# Patient Record
Sex: FEMALE | Race: ASIAN | Employment: FULL TIME | URBAN - METROPOLITAN AREA
[De-identification: names, ages, dates, MRNs, and addresses within clinical notes are randomized per-mention and may not be internally consistent; named-entity substitution may affect disease eponyms.]

---

## 2019-12-25 ENCOUNTER — APPOINTMENT (OUTPATIENT)
Dept: GENERAL RADIOLOGY | Age: 64
End: 2019-12-25
Attending: EMERGENCY MEDICINE
Payer: COMMERCIAL

## 2019-12-25 ENCOUNTER — APPOINTMENT (OUTPATIENT)
Dept: CT IMAGING | Age: 64
End: 2019-12-25
Attending: EMERGENCY MEDICINE
Payer: COMMERCIAL

## 2019-12-25 ENCOUNTER — HOSPITAL ENCOUNTER (EMERGENCY)
Age: 64
Discharge: HOME | End: 2019-12-25
Attending: EMERGENCY MEDICINE
Payer: COMMERCIAL

## 2019-12-25 VITALS
DIASTOLIC BLOOD PRESSURE: 77 MMHG | OXYGEN SATURATION: 98 % | BODY MASS INDEX: 25.91 KG/M2 | WEIGHT: 132 LBS | HEIGHT: 60 IN | HEART RATE: 68 BPM | TEMPERATURE: 97.8 F | SYSTOLIC BLOOD PRESSURE: 174 MMHG | RESPIRATION RATE: 16 BRPM

## 2019-12-25 DIAGNOSIS — S09.90XA CLOSED HEAD INJURY, INITIAL ENCOUNTER: ICD-10-CM

## 2019-12-25 DIAGNOSIS — S62.101A CLOSED FRACTURE OF RIGHT WRIST, INITIAL ENCOUNTER: Primary | ICD-10-CM

## 2019-12-25 DIAGNOSIS — S01.01XA LACERATION OF SCALP, INITIAL ENCOUNTER: ICD-10-CM

## 2019-12-25 PROCEDURE — A4565 SLINGS: HCPCS

## 2019-12-25 PROCEDURE — 75810000293 HC SIMP/SUPERF WND  RPR: Performed by: EMERGENCY MEDICINE

## 2019-12-25 PROCEDURE — 74011250636 HC RX REV CODE- 250/636: Performed by: EMERGENCY MEDICINE

## 2019-12-25 PROCEDURE — 74011250637 HC RX REV CODE- 250/637: Performed by: EMERGENCY MEDICINE

## 2019-12-25 PROCEDURE — 90471 IMMUNIZATION ADMIN: CPT | Performed by: EMERGENCY MEDICINE

## 2019-12-25 PROCEDURE — 90715 TDAP VACCINE 7 YRS/> IM: CPT | Performed by: EMERGENCY MEDICINE

## 2019-12-25 PROCEDURE — 75810000053 HC SPLINT APPLICATION: Performed by: EMERGENCY MEDICINE

## 2019-12-25 PROCEDURE — 77030039266 HC ADH SKN EXOFIN S2SG -A

## 2019-12-25 PROCEDURE — 70450 CT HEAD/BRAIN W/O DYE: CPT

## 2019-12-25 PROCEDURE — 73110 X-RAY EXAM OF WRIST: CPT

## 2019-12-25 PROCEDURE — 99283 EMERGENCY DEPT VISIT LOW MDM: CPT | Performed by: EMERGENCY MEDICINE

## 2019-12-25 RX ORDER — MORPHINE SULFATE 15 MG/1
15 TABLET ORAL ONCE
Status: COMPLETED | OUTPATIENT
Start: 2019-12-25 | End: 2019-12-25

## 2019-12-25 RX ORDER — ONDANSETRON 4 MG/1
4 TABLET, ORALLY DISINTEGRATING ORAL
Qty: 10 TAB | Refills: 0 | Status: SHIPPED | OUTPATIENT
Start: 2019-12-25 | End: 2020-02-10 | Stop reason: ALTCHOICE

## 2019-12-25 RX ORDER — MORPHINE SULFATE 15 MG/1
15 TABLET ORAL
Qty: 8 TAB | Refills: 0 | Status: SHIPPED | OUTPATIENT
Start: 2019-12-25 | End: 2019-12-28

## 2019-12-25 RX ORDER — ONDANSETRON 4 MG/1
4 TABLET, ORALLY DISINTEGRATING ORAL
Status: COMPLETED | OUTPATIENT
Start: 2019-12-25 | End: 2019-12-25

## 2019-12-25 RX ADMIN — MORPHINE SULFATE 15 MG: 15 TABLET ORAL at 18:30

## 2019-12-25 RX ADMIN — ONDANSETRON 4 MG: 4 TABLET, ORALLY DISINTEGRATING ORAL at 18:30

## 2019-12-25 RX ADMIN — TETANUS TOXOID, REDUCED DIPHTHERIA TOXOID AND ACELLULAR PERTUSSIS VACCINE, ADSORBED 0.5 ML: 5; 2.5; 8; 8; 2.5 SUSPENSION INTRAMUSCULAR at 18:31

## 2019-12-25 NOTE — ED TRIAGE NOTES
Pt states that she slipped and fell.   C/o right wrist pain and a small laceration to the right side of the scalp

## 2019-12-25 NOTE — ED PROVIDER NOTES
61-year-old female tripped [place] [place] and fell with her right arm outstretched, injuring her right wrist.  She struck the right side of her head and suffered a laceration. No loss of consciousness or headache. No nausea, vomiting, confusion. She has severe pain in her right wrist.  No numbness or tingling. Unknown last tetanus. Past Medical History:   Diagnosis Date    Esophageal reflux 3/19/2014    Hypercholesterolemia     Hypothyroidism 3/19/2014       History reviewed. No pertinent surgical history. History reviewed. No pertinent family history.     Social History     Socioeconomic History    Marital status:      Spouse name: Not on file    Number of children: Not on file    Years of education: Not on file    Highest education level: Not on file   Occupational History    Not on file   Social Needs    Financial resource strain: Not on file    Food insecurity:     Worry: Not on file     Inability: Not on file    Transportation needs:     Medical: Not on file     Non-medical: Not on file   Tobacco Use    Smoking status: Never Smoker    Smokeless tobacco: Never Used   Substance and Sexual Activity    Alcohol use: No    Drug use: No    Sexual activity: Yes     Partners: Male   Lifestyle    Physical activity:     Days per week: Not on file     Minutes per session: Not on file    Stress: Not on file   Relationships    Social connections:     Talks on phone: Not on file     Gets together: Not on file     Attends [employer] service: Not on file     Active member of club or organization: Not on file     Attends meetings of clubs or organizations: Not on file     Relationship status: Not on file    Intimate partner violence:     Fear of current or ex partner: Not on file     Emotionally abused: Not on file     Physically abused: Not on file     Forced sexual activity: Not on file   Other Topics Concern    Not on file   Social History Narrative    Not on file ALLERGIES: Depo-medrol [methylprednisolone acetate]    Review of Systems   Constitutional: Negative for chills and fever. HENT: Negative for hearing loss. Eyes: Negative for visual disturbance. Respiratory: Negative for cough and shortness of breath. Cardiovascular: Negative for chest pain and palpitations. Gastrointestinal: Negative for abdominal pain, diarrhea, nausea and vomiting. Musculoskeletal: Positive for arthralgias and joint swelling. Negative for back pain. Skin: Positive for wound. Negative for rash. Neurological: Negative for weakness and headaches. Psychiatric/Behavioral: Negative for confusion. Vitals:    12/25/19 1747   BP: 149/67   Pulse: 80   Resp: 16   Temp: 97.1 °F (36.2 °C)   SpO2: 98%   Weight: 59.9 kg (132 lb)   Height: 5' (1.524 m)            Physical Exam  Vitals signs and nursing note reviewed. Constitutional:       Appearance: She is well-developed. HENT:      Head: Normocephalic. Eyes:      Pupils: Pupils are equal, round, and reactive to light. Neck:      Musculoskeletal: Normal range of motion and neck supple. Cardiovascular:      Rate and Rhythm: Regular rhythm. Heart sounds: Normal heart sounds. Pulmonary:      Effort: Pulmonary effort is normal.      Breath sounds: Normal breath sounds. Abdominal:      Palpations: Abdomen is soft. Tenderness: There is no tenderness. Musculoskeletal:      Right wrist: She exhibits decreased range of motion, tenderness, bony tenderness, swelling and deformity. Arms:    Skin:     General: Skin is warm and dry. Neurological:      Mental Status: She is alert. Psychiatric:         Behavior: Behavior normal.          MDM  Number of Diagnoses or Management Options  Diagnosis management comments: Parts of this document were created using dragon voice recognition software. The chart has been reviewed but errors may still be present.     7:47 PM  Sugar tong splint placed by medic. rosa Hicks with ortho for follow up. Pain treated     I discussed the results of all labs, procedures, radiographs, and treatments with the patient and available family. Treatment plan is agreed upon and the patient is ready for discharge. Questions about treatment in the ED and differential diagnosis of presenting condition were answered. Patient was given verbal discharge instructions including, but not limited to, importance of returning to the emergency department for any concern of worsening or continued symptoms. Instructions were given to follow up with a primary care provider or specialist within 1-2 days. Adverse effects of medications, if prescribed, were discussed and patient was advised to refrain from significant physical activity until followed up by primary care physician and to not drive or operate heavy machinery after taking any sedating substances. Amount and/or Complexity of Data Reviewed  Tests in the radiology section of CPT®: ordered and reviewed (Xr Wrist Rt Ap/lat/obl Min 3v    Result Date: 12/25/2019  EXAMINATION: WRIST RADIOGRAPHS 12/25/2019 6:02 PM ACCESSION NUMBER: [id number] INDICATION: fall COMPARISON: None available TECHNIQUE: 3 views of the right wrist were obtained. FINDINGS: There is a transverse fracture of the distal radial metaphysis. There is dorsal displacement, foreshortening, and dorsal angulation of the distal fracture fragment. There is a minimally displaced fracture of the ulnar styloid process. The proximal carpal row remains aligned with the distal radial fracture fragment. There is associated soft tissue swelling. No radiopaque foreign body. IMPRESSION: Acute fractures of the distal right radius and ulna, as above.  VOICE DICTATED BY: Dr. Governor Maki    Result Date: 12/25/2019  EXAMINATION: HEAD CT WITHOUT CONTRAST 12/25/2019 6:46 PM ACCESSION NUMBER: [id number] INDICATION: fall, head injury COMPARISON: None available TECHNIQUE: Multiple-row detector helical CT examination of the head without intravenous contrast. Radiation dose reduction techniques were used for this study:  Our CT scanners use one or all of the following: Automated exposure control, adjustment of the mA and/or kVp according to patient's size, iterative reconstruction. FINDINGS: Small right frontal scalp hematoma. The ventricles are within normal limits. There is no midline shift. The basilar cisterns are patent. There is no cerebellar tonsillar ectopia or herniation. There is no evidence of acute intracranial hemorrhage or extra-axial collections. There is no CT evidence of acute infarction. No evidence of skull fracture. IMPRESSION: 1. Small right frontal scalp hematoma. 2. No underlying acute intracranial abnormality. VOICE DICTATED BY: Dr. Villegas Sat     )  Tests in the medicine section of CPT®: reviewed and ordered           Wound Closure by Adhesive  Date/Time: 12/25/2019 7:46 PM  Performed by: Isabel Cordova MD  Authorized by: Isabel Cordova MD     Consent:     Consent obtained:  Verbal    Consent given by:  Patient  Laceration details:     Location:  Scalp    Scalp location:  R temporal    Length (cm):  1  Repair type:     Repair type:  Simple  Exploration:     Contaminated: no    Treatment:     Area cleansed with:  Soap and water    Amount of cleaning:  Standard    Irrigation solution:  Sterile saline    Visualized foreign bodies/material removed: no    Skin repair:     Repair method:  Tissue adhesive  Approximation:     Approximation:  Close  Post-procedure details:     Dressing:  Open (no dressing)    Patient tolerance of procedure:   Tolerated well, no immediate complications  Comments:      HAT used to repair wound with dermabond

## 2019-12-26 NOTE — ED NOTES
I have reviewed discharge instructions with the patient and spouse. The patient and spouse verbalized understanding. Patient left ED via Discharge Method: ambulatory to Home with Enrico Andrade, her . Opportunity for questions and clarification provided. Patient given 2 scripts. To continue your aftercare when you leave the hospital, you may receive an automated call from our care team to check in on how you are doing. This is a free service and part of our promise to provide the best care and service to meet your aftercare needs.  If you have questions, or wish to unsubscribe from this service please call [phone]. Thank you for Choosing our [facility] Emergency Department.

## 2019-12-26 NOTE — PROGRESS NOTES
Patient called requesting a referral be faxed to The [address] [place] [address]. Referral sent per the patient's request, no additional needs.      Stephanie Villegas, [address] [facility]    [address] [facility]  [email or web address]

## 2019-12-26 NOTE — DISCHARGE INSTRUCTIONS
Keep sling and splint in place. Apply ice to wrist and scalp. Do not wash hair for several days. You may apply Vaseline to glue after 2 weeks to dissolve the glue once wound is healed. Return for worsening or concerning symptoms such as severe pain, headache, confusion, repetitive vomiting.

## 2019-12-30 ENCOUNTER — ANESTHESIA EVENT (OUTPATIENT)
Dept: SURGERY | Age: 64
End: 2019-12-30
Payer: COMMERCIAL

## 2019-12-30 RX ORDER — SODIUM CHLORIDE 0.9 % (FLUSH) 0.9 %
5-40 SYRINGE (ML) INJECTION EVERY 8 HOURS
Status: CANCELLED | OUTPATIENT
Start: 2019-12-30

## 2019-12-30 RX ORDER — SODIUM CHLORIDE 0.9 % (FLUSH) 0.9 %
5-40 SYRINGE (ML) INJECTION AS NEEDED
Status: CANCELLED | OUTPATIENT
Start: 2019-12-30

## 2019-12-31 ENCOUNTER — ANESTHESIA (OUTPATIENT)
Dept: SURGERY | Age: 64
End: 2019-12-31
Payer: COMMERCIAL

## 2019-12-31 ENCOUNTER — HOSPITAL ENCOUNTER (OUTPATIENT)
Age: 64
Setting detail: OUTPATIENT SURGERY
Discharge: HOME | End: 2019-12-31
Attending: ORTHOPAEDIC SURGERY | Admitting: ORTHOPAEDIC SURGERY
Payer: COMMERCIAL

## 2019-12-31 ENCOUNTER — APPOINTMENT (OUTPATIENT)
Dept: GENERAL RADIOLOGY | Age: 64
End: 2019-12-31
Attending: ORTHOPAEDIC SURGERY
Payer: COMMERCIAL

## 2019-12-31 VITALS
HEIGHT: 59 IN | RESPIRATION RATE: 16 BRPM | DIASTOLIC BLOOD PRESSURE: 60 MMHG | WEIGHT: 138 LBS | BODY MASS INDEX: 27.82 KG/M2 | HEART RATE: 75 BPM | OXYGEN SATURATION: 91 % | SYSTOLIC BLOOD PRESSURE: 142 MMHG | TEMPERATURE: 98 F

## 2019-12-31 PROCEDURE — 76010010054 HC POST OP PAIN BLOCK: Performed by: ORTHOPAEDIC SURGERY

## 2019-12-31 PROCEDURE — 76210000020 HC REC RM PH II FIRST 0.5 HR: Performed by: ORTHOPAEDIC SURGERY

## 2019-12-31 PROCEDURE — 74011000250 HC RX REV CODE- 250: Performed by: NURSE ANESTHETIST, CERTIFIED REGISTERED

## 2019-12-31 PROCEDURE — 77030000031 HC BIT DRL QC SYNT -C: Performed by: ORTHOPAEDIC SURGERY

## 2019-12-31 PROCEDURE — 74011250636 HC RX REV CODE- 250/636: Performed by: ANESTHESIOLOGY

## 2019-12-31 PROCEDURE — 77030003602 HC NDL NRV BLK BBMI -B: Performed by: ANESTHESIOLOGY

## 2019-12-31 PROCEDURE — 77030018836 HC SOL IRR NACL ICUM -A: Performed by: ORTHOPAEDIC SURGERY

## 2019-12-31 PROCEDURE — 74011250636 HC RX REV CODE- 250/636: Performed by: NURSE ANESTHETIST, CERTIFIED REGISTERED

## 2019-12-31 PROCEDURE — C1713 ANCHOR/SCREW BN/BN,TIS/BN: HCPCS | Performed by: ORTHOPAEDIC SURGERY

## 2019-12-31 PROCEDURE — 74011250636 HC RX REV CODE- 250/636: Performed by: ORTHOPAEDIC SURGERY

## 2019-12-31 PROCEDURE — 76210000063 HC OR PH I REC FIRST 0.5 HR: Performed by: ORTHOPAEDIC SURGERY

## 2019-12-31 PROCEDURE — 77030002916 HC SUT ETHLN J&J -A: Performed by: ORTHOPAEDIC SURGERY

## 2019-12-31 PROCEDURE — 76060000032 HC ANESTHESIA 0.5 TO 1 HR: Performed by: ORTHOPAEDIC SURGERY

## 2019-12-31 PROCEDURE — 76010000161 HC OR TIME 1 TO 1.5 HR INTENSV-TIER 1: Performed by: ORTHOPAEDIC SURGERY

## 2019-12-31 PROCEDURE — 77030008847 HC WRE K SYNT -A: Performed by: ORTHOPAEDIC SURGERY

## 2019-12-31 PROCEDURE — 73100 X-RAY EXAM OF WRIST: CPT

## 2019-12-31 PROCEDURE — A4565 SLINGS: HCPCS | Performed by: ORTHOPAEDIC SURGERY

## 2019-12-31 PROCEDURE — 77030000032 HC CUF TRNQT ZIMM -B: Performed by: ORTHOPAEDIC SURGERY

## 2019-12-31 PROCEDURE — 76942 ECHO GUIDE FOR BIOPSY: CPT | Performed by: ORTHOPAEDIC SURGERY

## 2019-12-31 PROCEDURE — 77030002966 HC SUT PDS J&J -A: Performed by: ORTHOPAEDIC SURGERY

## 2019-12-31 DEVICE — SCREW BNE L10MM DIA2.4MM CORT S STL ST T8 STARDRV RECESS: Type: IMPLANTABLE DEVICE | Site: ARM | Status: FUNCTIONAL

## 2019-12-31 DEVICE — 2.4MM VA LOCKING SCREW STARDRIVE 14MM: Type: IMPLANTABLE DEVICE | Site: ARM | Status: FUNCTIONAL

## 2019-12-31 DEVICE — SCREW BNE L14MM DIA2.4MM S STL ST LOK FULL THRD T8 STARDRV: Type: IMPLANTABLE DEVICE | Site: ARM | Status: FUNCTIONAL

## 2019-12-31 DEVICE — SCREW BNE L13MM DIA24MM UNICORTICAL S STL ST NONCANNULATED: Type: IMPLANTABLE DEVICE | Site: ARM | Status: FUNCTIONAL

## 2019-12-31 DEVICE — SCREW BNE L10MM DIA2.4MM S STL ST VAR ANG LOK FULL THRD T8: Type: IMPLANTABLE DEVICE | Site: ARM | Status: FUNCTIONAL

## 2019-12-31 DEVICE — 2.4MM VA-LCP 2-CLMN VLR DSTL RADIUS PL 6H HD/3H SHAFT/RIGHT
Type: IMPLANTABLE DEVICE | Site: ARM | Status: FUNCTIONAL
Brand: VA-LCP

## 2019-12-31 DEVICE — PIN FIX L16MM DIA1.8MM DST RAD S STL BTTRS VAR ANG LOK T8: Type: IMPLANTABLE DEVICE | Site: ARM | Status: FUNCTIONAL

## 2019-12-31 RX ORDER — SODIUM CHLORIDE 0.9 % (FLUSH) 0.9 %
5-40 SYRINGE (ML) INJECTION AS NEEDED
Status: DISCONTINUED | OUTPATIENT
Start: 2019-12-31 | End: 2019-12-31 | Stop reason: HOSPADM

## 2019-12-31 RX ORDER — DIPHENHYDRAMINE HYDROCHLORIDE 50 MG/ML
12.5 INJECTION, SOLUTION INTRAMUSCULAR; INTRAVENOUS
Status: DISCONTINUED | OUTPATIENT
Start: 2019-12-31 | End: 2019-12-31 | Stop reason: HOSPADM

## 2019-12-31 RX ORDER — ONDANSETRON 2 MG/ML
INJECTION INTRAMUSCULAR; INTRAVENOUS AS NEEDED
Status: DISCONTINUED | OUTPATIENT
Start: 2019-12-31 | End: 2019-12-31 | Stop reason: HOSPADM

## 2019-12-31 RX ORDER — OXYCODONE HYDROCHLORIDE 5 MG/1
5 TABLET ORAL
Status: DISCONTINUED | OUTPATIENT
Start: 2019-12-31 | End: 2019-12-31 | Stop reason: HOSPADM

## 2019-12-31 RX ORDER — HYDROMORPHONE HYDROCHLORIDE 2 MG/ML
0.5 INJECTION, SOLUTION INTRAMUSCULAR; INTRAVENOUS; SUBCUTANEOUS
Status: DISCONTINUED | OUTPATIENT
Start: 2019-12-31 | End: 2019-12-31 | Stop reason: HOSPADM

## 2019-12-31 RX ORDER — EPHEDRINE SULFATE/0.9% NACL/PF 50 MG/5 ML
SYRINGE (ML) INTRAVENOUS AS NEEDED
Status: DISCONTINUED | OUTPATIENT
Start: 2019-12-31 | End: 2019-12-31 | Stop reason: HOSPADM

## 2019-12-31 RX ORDER — NALOXONE HYDROCHLORIDE 0.4 MG/ML
0.1 INJECTION, SOLUTION INTRAMUSCULAR; INTRAVENOUS; SUBCUTANEOUS
Status: DISCONTINUED | OUTPATIENT
Start: 2019-12-31 | End: 2019-12-31 | Stop reason: HOSPADM

## 2019-12-31 RX ORDER — SODIUM CHLORIDE 0.9 % (FLUSH) 0.9 %
5-40 SYRINGE (ML) INJECTION EVERY 8 HOURS
Status: DISCONTINUED | OUTPATIENT
Start: 2019-12-31 | End: 2019-12-31 | Stop reason: HOSPADM

## 2019-12-31 RX ORDER — FLUMAZENIL 0.1 MG/ML
0.2 INJECTION INTRAVENOUS
Status: DISCONTINUED | OUTPATIENT
Start: 2019-12-31 | End: 2019-12-31 | Stop reason: HOSPADM

## 2019-12-31 RX ORDER — LIDOCAINE HYDROCHLORIDE 10 MG/ML
0.1 INJECTION INFILTRATION; PERINEURAL AS NEEDED
Status: DISCONTINUED | OUTPATIENT
Start: 2019-12-31 | End: 2019-12-31 | Stop reason: HOSPADM

## 2019-12-31 RX ORDER — MIDAZOLAM HYDROCHLORIDE 1 MG/ML
2 INJECTION, SOLUTION INTRAMUSCULAR; INTRAVENOUS
Status: COMPLETED | OUTPATIENT
Start: 2019-12-31 | End: 2019-12-31

## 2019-12-31 RX ORDER — PROPOFOL 10 MG/ML
INJECTION, EMULSION INTRAVENOUS
Status: DISCONTINUED | OUTPATIENT
Start: 2019-12-31 | End: 2019-12-31 | Stop reason: HOSPADM

## 2019-12-31 RX ORDER — SODIUM CHLORIDE, SODIUM LACTATE, POTASSIUM CHLORIDE, CALCIUM CHLORIDE 600; 310; 30; 20 MG/100ML; MG/100ML; MG/100ML; MG/100ML
100 INJECTION, SOLUTION INTRAVENOUS CONTINUOUS
Status: DISCONTINUED | OUTPATIENT
Start: 2019-12-31 | End: 2019-12-31 | Stop reason: HOSPADM

## 2019-12-31 RX ORDER — SODIUM CHLORIDE, SODIUM LACTATE, POTASSIUM CHLORIDE, CALCIUM CHLORIDE 600; 310; 30; 20 MG/100ML; MG/100ML; MG/100ML; MG/100ML
75 INJECTION, SOLUTION INTRAVENOUS CONTINUOUS
Status: DISCONTINUED | OUTPATIENT
Start: 2019-12-31 | End: 2019-12-31 | Stop reason: HOSPADM

## 2019-12-31 RX ORDER — MIDAZOLAM HYDROCHLORIDE 1 MG/ML
2 INJECTION, SOLUTION INTRAMUSCULAR; INTRAVENOUS
Status: DISCONTINUED | OUTPATIENT
Start: 2019-12-31 | End: 2019-12-31 | Stop reason: HOSPADM

## 2019-12-31 RX ORDER — IBUPROFEN 200 MG
TABLET ORAL
Qty: 40 TAB | Refills: 0 | Status: SHIPPED | OUTPATIENT
Start: 2019-12-31 | End: 2020-02-10 | Stop reason: ALTCHOICE

## 2019-12-31 RX ORDER — CEFAZOLIN SODIUM/WATER 2 G/20 ML
2 SYRINGE (ML) INTRAVENOUS ONCE
Status: COMPLETED | OUTPATIENT
Start: 2019-12-31 | End: 2019-12-31

## 2019-12-31 RX ORDER — PROPOFOL 10 MG/ML
INJECTION, EMULSION INTRAVENOUS AS NEEDED
Status: DISCONTINUED | OUTPATIENT
Start: 2019-12-31 | End: 2019-12-31 | Stop reason: HOSPADM

## 2019-12-31 RX ORDER — FENTANYL CITRATE 50 UG/ML
100 INJECTION, SOLUTION INTRAMUSCULAR; INTRAVENOUS
Status: COMPLETED | OUTPATIENT
Start: 2019-12-31 | End: 2019-12-31

## 2019-12-31 RX ADMIN — SODIUM CHLORIDE, SODIUM LACTATE, POTASSIUM CHLORIDE, AND CALCIUM CHLORIDE 100 ML/HR: 600; 310; 30; 20 INJECTION, SOLUTION INTRAVENOUS at 08:35

## 2019-12-31 RX ADMIN — ONDANSETRON 4 MG: 2 INJECTION INTRAMUSCULAR; INTRAVENOUS at 11:31

## 2019-12-31 RX ADMIN — FENTANYL CITRATE 50 MCG: 50 INJECTION, SOLUTION INTRAMUSCULAR; INTRAVENOUS at 08:41

## 2019-12-31 RX ADMIN — PROPOFOL 20 MG: 10 INJECTION, EMULSION INTRAVENOUS at 10:54

## 2019-12-31 RX ADMIN — MIDAZOLAM 2 MG: 1 INJECTION INTRAMUSCULAR; INTRAVENOUS at 08:41

## 2019-12-31 RX ADMIN — Medication 2 G: at 10:56

## 2019-12-31 RX ADMIN — Medication 10 MG: at 11:37

## 2019-12-31 RX ADMIN — ROPIVACAINE HYDROCHLORIDE 30 ML: 5 INJECTION, SOLUTION EPIDURAL; INFILTRATION; PERINEURAL at 08:46

## 2019-12-31 RX ADMIN — Medication 10 MG: at 11:34

## 2019-12-31 RX ADMIN — Medication 10 MG: at 11:14

## 2019-12-31 RX ADMIN — PROPOFOL 20 MG: 10 INJECTION, EMULSION INTRAVENOUS at 10:55

## 2019-12-31 RX ADMIN — PROPOFOL 140 MCG/KG/MIN: 10 INJECTION, EMULSION INTRAVENOUS at 10:54

## 2019-12-31 NOTE — ANESTHESIA POSTPROCEDURE EVALUATION
Procedure(s):  RIGHT DISTAL RADIUS OPEN REDUCTION INTERNAL FIXATION . total IV anesthesia    Anesthesia Post Evaluation      Multimodal analgesia: multimodal analgesia used between 6 hours prior to anesthesia start to PACU discharge  Patient location during evaluation: PACU  Patient participation: complete - patient participated  Level of consciousness: awake  Pain management: adequate  Airway patency: patent  Anesthetic complications: no  Cardiovascular status: acceptable  Respiratory status: spontaneous ventilation and acceptable  Hydration status: acceptable  Post anesthesia nausea and vomiting:  none      Vitals Value Taken Time   /56 12/31/2019 12:04 PM   Temp 36.6 °C (97.8 °F) 12/31/2019 11:50 AM   Pulse 77 12/31/2019 12:11 PM   Resp 14 12/31/2019 12:04 PM   SpO2 93 % 12/31/2019 12:11 PM   Vitals shown include unvalidated device data.

## 2019-12-31 NOTE — ANESTHESIA PREPROCEDURE EVALUATION
Relevant Problems   No relevant active problems       Anesthetic History   No history of anesthetic complications            Review of Systems / Medical History  Patient summary reviewed and pertinent labs reviewed    Pulmonary  Within defined limits                 Neuro/Psych   Within defined limits           Cardiovascular              Hyperlipidemia    Exercise tolerance: >4 METS  Comments: Denies recent CP, SOB or Palpitations   GI/Hepatic/Renal     GERD: well controlled           Endo/Other      Hypothyroidism: well controlled       Other Findings              Physical Exam    Airway  Mallampati: I  TM Distance: 4 - 6 cm  Neck ROM: normal range of motion   Mouth opening: Normal     Cardiovascular  Regular rate and rhythm,  S1 and S2 normal,  no murmur, click, rub, or gallop             Dental  No notable dental hx       Pulmonary  Breath sounds clear to auscultation               Abdominal  GI exam deferred       Other Findings            Anesthetic Plan    ASA: 2  Anesthesia type: total IV anesthesia      Post-op pain plan if not by surgeon: peripheral nerve block single    Induction: Intravenous  Anesthetic plan and risks discussed with: Patient and Spouse

## 2019-12-31 NOTE — DISCHARGE INSTRUCTIONS
POST OP INSTRUCTIONS      1. Patient has appointment for 1/9/20 at 1:40 PM at the  [facility]. 2.  For problems call Dr Lebron Martin, [address] [facility],  [phone]          Appointments only,  [phone]    3. Ice and elevate the surgical site. 4.  Keep splints and dressing dry and intact. 5.  If able to tolerate, take   Acetaminophen 325 mg  2 every 4 hrs   And                                               Ibuprofen 200 mg   3 every 6 hrs   OR   Aleve 220 mg 2 every 12 hrs to help with pain                                                   ( Do not take Ibuprofen or Aleve if taking any other anti inflamatory  drug, NSAID, or anti arthritis drug or if taking blood thinners.)                                                 Vitamin C   500 mg  Once a day for 2 months. DIET  · Clear liquids until no nausea or vomiting; then light diet for the first day. · Advance to regular diet on second day, unless your doctor orders otherwise. · If nausea and vomiting continues, call your doctor. PAIN  · Take pain medication as directed by your doctor. · Call your doctor if pain is NOT relieved by medication. · DO NOT take aspirin of blood thinners unless directed by your doctor. CALL YOUR DOCTOR IF   · Excessive bleeding that does not stop after holding pressure over the area  · Temperature of 101 degrees F or above  · Excessive redness, swelling or bruising, and/ or green or yellow, smelly discharge from incision    AFTER ANESTHESIA   · For the first 24 hours: DO NOT Drive, Drink alcoholic beverages, or Make important decisions. · Be aware of dizziness following anesthesia and while taking pain medication.      After general anesthesia or intravenous sedation, for 24 hours or while taking prescription Narcotics:  · Limit your activities  · A responsible adult needs to be with you for the next 24 hours  · Do not drive and operate hazardous machinery  · Do not make important personal or business decisions  · Do  not drink alcoholic beverages  · If you have not urinated within 8 hours after discharge, please contact your surgeon on call. · If you have sleep apnea and have a CPAP machine, please use it for all naps and sleeping. · Please use caution when taking narcotics and any of your home medications that may cause drowsiness. *  Please give a list of your current medications to your Primary Care Provider. *  Please update this list whenever your medications are discontinued, doses are      changed, or new medications (including over-the-counter products) are added. *  Please carry medication information at all times in case of emergency situations. These are general instructions for a healthy lifestyle:  No smoking/ No tobacco products/ Avoid exposure to second hand smoke  Surgeon General's Warning:  Quitting smoking now greatly reduces serious risk to your health. Obesity, smoking, and sedentary lifestyle greatly increases your risk for illness  A healthy diet, regular physical exercise & weight monitoring are important for maintaining a healthy lifestyle    You may be retaining fluid if you have a history of heart failure or if you experience any of the following symptoms:  Weight gain of 3 pounds or more overnight or 5 pounds in a week, increased swelling in our hands or feet or shortness of breath while lying flat in bed. Please call your doctor as soon as you notice any of these symptoms; do not wait until your next office visit.

## 2019-12-31 NOTE — ANESTHESIA PROCEDURE NOTES
Peripheral Block    Start time: 12/31/2019 8:42 AM  End time: 12/31/2019 8:46 AM  Performed by: Suha Roe MD  Authorized by: Suha Roe MD       Pre-procedure:    Indications: at surgeon's request and post-op pain management    Preanesthetic Checklist: patient identified, risks and benefits discussed, site marked, timeout performed, anesthesia consent given and patient being monitored    Timeout Time: 08:41          Block Type:   Block Type:  Axillary  Laterality:  Right  Monitoring:  Standard ASA monitoring, continuous pulse ox, frequent vital sign checks, heart rate, responsive to questions and oxygen  Injection Technique:  Single shot  Procedures: ultrasound guided and nerve stimulator    Patient Position: seated  Prep: chlorhexidine    Location:  Axilla  Needle Type:  Stimuplex  Needle Gauge:  22 G  Needle Localization:  Nerve stimulator and ultrasound guidance  Motor Response: minimal motor response >0.4 mA      Assessment:  Number of attempts:  1  Injection Assessment:  Incremental injection every 5 mL, local visualized surrounding nerve on ultrasound, negative aspiration for CSF, negative aspiration for blood, no paresthesia, no intravascular symptoms and ultrasound image on chart  Patient tolerance:  Patient tolerated the procedure well with no immediate complications

## 2019-12-31 NOTE — OP NOTES
OPERATIVE NOTE    Erinn Falk is a 62 y.o. female with a fx of the distal radius that is not in satisfactory position for closed treatment. 12/31/2019    PREOP DIAGNOSIS: NON-ARTICULAR FRACTURE OF THE  RIGHT DISTAL RADIUS    POSTOP DIAGNOSIS:  NON-ARTICULAR FRACTURE OF THE RIGHT DISTAL RADIUS    PROCEDURE:  OPEN REDUCTION AND INTERNAL FIXATION OF  NON-ARTICULAR 2 PART FRACTURE OF THE RIGHT DISTAL RADIUS    SURGEON:  Maranda Sethi MD    ANESTHESIA:   AXILLARY BLOCK    INDICATIONS: The planned procedure and alternatives for treatment have been discussed previously. Informed consent has been obtained. The operative site has been marked and perioperative antibiotics ordered if felt to be indicated. PROCEDURAL NOTE:   With the patient in the supine position and under an axillary block for anesthesia, the right upper extremity was prepped and draped as a sterile field. The patient had been given IV prophylactic Ancef. A time out was held with the operative team and the patient, procedure, surgical site and surgeon identified. The extremity was exsanguinated with a sterile Esmarch bandage and a tourniquet around the upper arm inflated to 250 mm HG. A longitudinal skin incision was made over the flexor carpi radialis tendon. Small bleeders were electro coagulated. The FCR was released from it sheath and retracted out of harms way. The pronator quadratus and the distal part of the FPL muscle  were detached radially and retracted ulnarward protecting the median nerve and carpal tunnel. The volar aspect of the distal radius was exposed subperiosteally and the fracture fragments reduced by using a Franklin elevator through the fracture to lever the distal fragment volarly back into position. It had been displaced dorsally about half the AP width of the radius. A Synthes volar variable angle standard plate was placed over the fracture and positioned using the image intensifier to aid in the placement. It was temporarily held in place while permanent fixation was obtained using locking and non locked screws. A smooth peg was used into the radial styloid. At various times the position and length of the screws were checked with fluoroscopy. After all the necessary screws were in place the position was again checked with fluoro and the fixation seemed to be stable and well aligned with no overly long fixation extending into the soft tissues. The wound was thoroughly irrigated and the pronator reattached with 3-0 PDS sutures. The FCR sheath was also repaired with 3-0 PDS. The skin was closed with 4-0 nylon. A xeroform and gauze dressing was applied and held in place with webril. An ACE wrap was used to hold a volar Ortho-glass splint in place. The DRUJ felt stable with rotation of the forearm. The tourniquet was released with return of flow to the fingers. The patient was sent to the recovery room in good condition. TOURNIQUET TIME:   Total Tourniquet Time Documented:  Upper Arm (Right) - 37 minutes  Total: Upper Arm (Right) - 37 minutes      IMPLANTS:   Implant Name Type Inv.  Item Serial No.  Lot No. LRB No. Used Action   PLATE RAD DSTL 2-C 6H HD/3H RT -- VOLAR VA-LCP 2.4MM - [id number]  PLATE RAD DSTL 2-C 6H HD/3H RT -- VOLAR VA-LCP 2.4MM  [employer] [place] [id number] Right 1 Implanted   SCR VA LCK ST STRDRV 2.4X14MM --  - [id number]  SCR VA LCK ST STRDRV 2.4X14MM --   [employer] [place] [id number] Right 1 Implanted   SCR BNE LCK ST STARDRV 2.4X14+ -- SS - [id number]  SCR BNE LCK ST STARDRV 2.4X14+ -- SS  [employer] [place] [id number] Right 1 Implanted   SCR VA LCK ST STRDRV 2.4X13MM --  - [id number]  SCR VA LCK ST STRDRV 2.4X13MM --   [employer] [place] [id number] Right 1 Implanted   SCR BNE CRTX ST T8 2.4X10MM SS --  - [id number]  SCR BNE CRTX ST T8 2.4X10MM SS --   [employer] [place] [id number] Right 2 Implanted   PIN BTRSS VA LCK 1.8X16MM -- STARDRIVE RECESS T8 - [id number]  PIN BTRSS VA LCK 1.8X16MM -- STARDRIVE RECESS T8  [employer] [place] [id number] Right 1 Implanted   SCR VA LCK ST STRDRV 2.4X10MM --  - [id number]  SCR VA LCK ST STRDRV 2.4X10MM --   [employer] [place] [id number] Right 1 Implanted       SIGNED: Mumtaz Zambrano MD

## 2019-12-31 NOTE — H&P
Outpatient Surgery History and Physical:  Miriam Arteaga was seen and examined. CHIEF COMPLAINT:   Injury of the right wrist.  HPI:  The patient is a RHD  Woman who [place] at [facility] 6 days ago on 12/25/19. She had immediate pain and deformity of her right wrist.  She was seen in the            ER and x-rays showed a displaced intra-articular fx of the right distal radius with shortening and about 45 degrees of volar angulation. I saw her in the office yesterday                 and we discussed and agreed on operative treatment of the fracture. PE:   There were no vitals taken for this visit. Heart:   Regular rhythm      Lungs:  Are clear    Right wrist and Hand:  There is deformity of the wrist with tenderness over the distal radius and pain with movement. Circulation and Sensation are intact in the              Radial, Ulnar and Median Nerve distributions   Skin is normal.    Past Medical History:    Patient Active Problem List    Diagnosis    Hypothyroidism    Esophageal reflux    Hyperlipidemia       Surgical History:   Past Surgical History:   Procedure Laterality Date    HX WISDOM TEETH EXTRACTION  1983       Social History: Patient  reports that she has never smoked. She has never used smokeless tobacco. She reports that she does not drink alcohol or use drugs. Family History: No family history on file. Allergies: Reviewed per EMR  Allergies   Allergen Reactions    Depo-Medrol [Methylprednisolone Acetate] Unable to Obtain       Medications:    No current facility-administered medications on file prior to encounter. Current Outpatient Medications on File Prior to Encounter   Medication Sig    ondansetron (ZOFRAN ODT) 4 mg disintegrating tablet Take 1 Tab by mouth every eight (8) hours as needed for Nausea.     levothyroxine (SYNTHROID) 75 mcg tablet TAKE ONE TABLET BY MOUTH EVERY DAY    ergocalciferol (ERGOCALCIFEROL) 50,000 unit capsule Take 1 Cap by mouth every seven (7) days. (Patient taking differently: Take 50,000 Units by mouth every seven (7) days. Every Saturday)       The surgery is planned for ORIF of the right distal radius fracture. The patient is here today for outpatient surgery. I have examined the patient, no changes are noted in the patient's medical status. Necessity for the procedure/care is still present and the history and physical above is current. See the office notes for the full long term history of the problem. Please see the recent office notes for the musculoskeletal examination.     Signed By: Alex Banuelos MD     December 31, 2019 7:51 AM

## 2020-01-10 PROBLEM — E55.9 VITAMIN D DEFICIENCY: Status: ACTIVE | Noted: 2020-01-10

## 2020-01-10 PROBLEM — S62.101A CLOSED FRACTURE OF RIGHT WRIST: Status: ACTIVE | Noted: 2020-01-10

## 2020-01-14 ENCOUNTER — HOSPITAL ENCOUNTER (OUTPATIENT)
Dept: PHYSICAL THERAPY | Age: 65
Discharge: HOME | End: 2020-01-14
Payer: COMMERCIAL

## 2020-01-14 PROCEDURE — 97165 OT EVAL LOW COMPLEX 30 MIN: CPT

## 2020-01-14 PROCEDURE — 97110 THERAPEUTIC EXERCISES: CPT

## 2020-01-14 PROCEDURE — 97140 MANUAL THERAPY 1/> REGIONS: CPT

## 2020-01-14 NOTE — PROGRESS NOTES
Jacinta Tatum  : 1955  Primary: [address] [facility] Hmo/c*  Secondary:  [address]  at [place] [id number] [facility], [address] [place] [address],[address], [facility] [address].  [phone]   [phone]       OUTPATIENT OCCUPATIONAL THERAPY: Daily Treatment Note 2020  Visit Count:  1    ICD-10: Treatment Diagnosis: Stiffness of right wrist, not elsewhere classified (M25.631)Pain in right wrist (M25.531)Pain in right hand (M79.641)Stiffness of right hand, not elsewhere classified (M25.641)  Precautions/Allergies:   Depo-medrol [methylprednisolone acetate]   TREATMENT PLAN:  Effective Dates: 2020 TO 2020 (90 days).   Frequency/Duration: 2-3 times a week for 90 Day(s)  Pre-treatment Symptoms/Complaints: Pain and stiffness in right hand and wrist.  Pain: Initial: Pain Intensity 1: 4(increasing to 10 when most intense)  Pain Location 1: Hand, Wrist  Pain Orientation 1: Right  Post Session:  5/10   Medications Last Reviewed:  2020   Updated Objective Findings:  See evaluation note from today   TREATMENT:       Manual Therapy: (Soft Tissue Mobilization Duration  Duration: 15 Minutes  Duration: 15 Minutes): Technique: Retrograde massage  Tissue Mobilized: Connective tissue  RUE Soft Tissue Mobilization: Yes  Technique: Retrograde massage  Tissue Mobilized: Scar/adhesion   Therapeutic Exercise:                                                                               : The patient was instructed in a home exercise program.                                                Date:  20 Date: Date: Date: Date:   Activity/Exercise Parameters Parameters Parameters Parameters Parameters   AROM during Fluidotherapy        Paraffin with Stretch          Retrograde massage, Friction Scar massage, Joint Mobilization 15 min         Scarf Curl          Washer Game        Individual Gripper          Hand [place]          Cones          Pegs          Clothes Pins          A-R Bar Exerstick          Velcro-Roll          AROM Ex 30 min       RESISTIVE EXERCISES Weight/ Sets/Reps   Weight/ Sets/Reps Weight/ Sets/Reps Weight/ Sets/Reps Weight/ Sets/Reps   WEIGHT WELL        Sup/Pro        UD/RD        Wrist Flex/Ext        Free Weights          UBE(Minutes)          Nautilus        Compound Row        Vertical Chest        Overhead Press                    HEP: As above; handouts given to patient for all exercises. Therapeutic Modalities:                                               Joint Mobilization:        Treatment Times:  · Therapeutic Exercise: 30 minutes  · Manual Therapy: 15 minutes  · Parafin:  minutes  · Whirlpool:  minutes  · Other:  minutes    Treatment/Session Summary:    · Response to Treatment:  Patients tolerated treatment well with no complications. Upon completion of treatment, skin condition was normal.  · Communication/Consultation:  None today  · Equipment provided today:  None today  · Recommendations/Intent for next treatment session: Next visit will focus on increasing motion and decreasing pain. .    Total Treatment Billable Duration:  45 minutes  OT Patient Time In/Time Out  Time In: 1000  Time Out: [address] [facility],[address], OT    Future Appointments   Date Time Provider Gaye Lagunas   1/15/2020  1:00 PM Karolina Boothe OT SFEORPDEBI BONILLA

## 2020-01-14 NOTE — THERAPY EVALUATION
Jody Derik  : 1955  Primary: [address] [facility] [address] Hmo/c*  Secondary:  [address] at [address] [facility]  [address] [facility], [address] [place] [address] 83[address], [address] [facility]  [phone]   [phone]          OUTPATIENT OCCUPATIONAL THERAPY:Initial Assessment 2020   ICD-10: Treatment Diagnosis: Stiffness of right wrist, not elsewhere classified (M25.631)Pain in right wrist (M25.531)Pain in right hand (M79.641)Stiffness of right hand, not elsewhere classified (M25.641)  Precautions/Allergies:   Depo-medrol [methylprednisolone acetate]   TREATMENT PLAN:  Effective Dates: 2020 TO 2020 (90 days). Frequency/Duration: 2-3 times a week for 90 Day(s) MEDICAL/REFERRING DIAGNOSIS:  Other extraarticular fracture of lower end of right radius, subsequent encounter for closed fracture with routine healing [S52.551D]   DATE OF ONSET: 2019  DATE OF SURGERY: 2019  REFERRING PHYSICIAN: Janak Tanner MD MD Orders: Evaluate and treat: ROM  Return MD Appointment:      INITIAL ASSESSMENT:   Ms. Lea Hunt presents with decreased functional use, strength and range of motion of her right hand, wrist and upper extremity that is affecting her independence with activities of daily living and ability to perform job tasks. I feel that Ms. Lea Hunt will benefit from skilled occupational therapy to maximize the functional use of her hand, wrist and upper extremity in daily activities and work tasks. PROBLEM LIST (Impacting functional limitations):  1. Pain in right hand and wrist.  2. Decreased motion in right hands and wrist.  3. Decreased strength in right hand. INTERVENTIONS PLANNED: (Treatment may consist of any combination of the following)  1. Modalities that may include fluidotherapy, paraffin, ultrasound, and light therapy. 2. Therapeutic exercise including a home exercise program.  3. Manual therapy. 4. Therapeutic activities.      GOALS: (Goals have been discussed and agreed upon with patient.)  Short-Term Functional Goals: Time Frame: 4 weeks  1. Decrease pain to 5 to allow patient to perform self care tasks. 2. Increase motion in right hand and wrist by 20 degrees to improve functional use of upper extremity in ADL activities. 3. Increase strength in right hand by 10 pounds to allow patient to  and lift objects during self care activities. Discharge Goals: Time Frame: 12 weeks  1. Decrease pain to 3 to allow patient to perform all household and work tasks. 2. Increase motion in right hand and wrist by 30 degreees to allow patient to perform all ADL activities. 3. Increase strength in right hand by 20 pounds to allow patient to , lift, hold, and carry heavy objects. OUTCOME MEASURE:   Tool Used: Disabilities of the Arm, Shoulder and Hand (DASH) Questionnaire - Quick Version  Score:  Initial: 53/55  Most Recent: X/55 (Date: -- )   Interpretation of Score: The DASH is designed to measure the activities of daily living in person's with upper extremity dysfunction or pain. Each section is scored on a 1-5 scale, 5 representing the greatest disability. The scores of each section are added together for a total score of 55. MEDICAL NECESSITY:   · Patient is expected to demonstrate progress in strength and range of motion to increase independence with ADL, household and work activities. .  REASON FOR SERVICES/OTHER COMMENTS:  · The patient has limited motion, strength and function in her right U.E..  Total Duration:  OT Patient Time In/Time Out  Time In: 1000  Time Out: 1100    Rehabilitation Potential For Stated Goals: Good  Regarding Caroline Combs's therapy, I certify that the treatment plan above will be carried out by a therapist or under their direction. Thank you for this referral,  Luis Dhaliwal OT     Referring Physician Signature: Meghana Gonzalez MD No Signature is Required for this note.      PAIN/SUBJECTIVE:   Initial: Pain Intensity 1: 4(increasing to 10 when most intense)  Pain Location 1: Hand, Wrist  Pain Orientation 1: Right   Post Session:  5/10   OCCUPATIONAL PROFILE & HISTORY:   History of Injury/Illness (Reason for Referral): The patient fell when walking at [employer]. Past Medical History/Comorbidities:   Ms. Saurabh Garcia  has a past medical history of GERD (gastroesophageal reflux disease), Hypercholesterolemia, Hypothyroidism (03/19/2014), and Right wrist pain. Ms. Saurabh Garcia  has a past surgical history that includes hx wisdom teeth extraction (1983). Social History/Living Environment:   Home Environment: Private residence  Prior Level of Function/Work/Activity:  independent  Dominant Side:         RIGHT   Ambulatory/Rehab Services H2 Model Falls Risk Assessment   Risk Factors:       (5)  History of Recent Falls [w/in 3 months] Ability to Rise from Chair:       (0)  Ability to rise in a single movement   Falls Prevention Plan:       No modifications necessary   Total: (5 or greater = High Risk): 5   ©2010 [facility] Patent #[id number]. Federal Law prohibits the replication, distribution or use without written permission from [facility] [employer]   Current Medications:       Current Outpatient Medications:     levothyroxine (SYNTHROID) 75 mcg tablet, TAKE ONE TABLET BY MOUTH EVERY DAY, Disp: 90 Tab, Rfl: 3    ibuprofen (MOTRIN) 200 mg tablet, Take 3 tablets 4 times a day or 4 tablets 3 times a day for up to 12 tablet a day. Indications: pain, Disp: 40 Tab, Rfl: 0    atorvastatin (LIPITOR) 20 mg tablet, Take 20 mg by mouth nightly., Disp: , Rfl:     acetaminophen (TYLENOL EXTRA STRENGTH) 500 mg tablet, Take 500 mg by mouth every six (6) hours as needed for Pain., Disp: , Rfl:     multivitamin with minerals (HAIR,SKIN AND NAILS PO), Take 1 Tab by mouth daily. , Disp: , Rfl:     ondansetron (ZOFRAN ODT) 4 mg disintegrating tablet, Take 1 Tab by mouth every eight (8) hours as needed for Nausea., Disp: 10 Tab, Rfl: 0    ergocalciferol (ERGOCALCIFEROL) 50,000 unit capsule, Take 1 Cap by mouth every seven (7) days. (Patient taking differently: Take 50,000 Units by mouth every seven (7) days. Every Saturday), Disp: 12 Cap, Rfl: 3   Date Last Reviewed:  1/14/2020    Complexity Level: Brief history (0):  LOW COMPLEXITY   ASSESSMENT OF OCCUPATIONAL PERFORMANCE:   RANGE OF MOTION:     · AROM: The patient has limited motion in her right hand and is unable to make a full composite fist. Right wrist motion is as follows: flexion 15, extension 15, U.D. 10, R.D. 10, supination 20, pronation 75 degrees. STRENGTH:   Not tested yet due to recent surgery. SENSATION:  The patient reports numbness and tingling in her right hand. Physical Skills Involved:  1. Range of Motion  2. Strength  3. Sensation  4. Pain (acute)  5. Edema Cognitive Skills Affected (resulting in the inability to perform in a timely and safe manner):   1. none Psychosocial Skills Affected:  1. none   Number of elements that affect the Plan of Care[de-identified] 5+:  HIGH COMPLEXITY   CLINICAL DECISION MAKING:   Clinical Decision-Making Assessment:     Assessment process, impact of co-morbidities, assessment modification\need for assistance, and selection of interventions: Analytical Complexity:LOW COMPLEXITY

## 2020-01-15 ENCOUNTER — HOSPITAL ENCOUNTER (OUTPATIENT)
Dept: PHYSICAL THERAPY | Age: 65
Discharge: HOME | End: 2020-01-15
Payer: COMMERCIAL

## 2020-01-15 PROCEDURE — 97140 MANUAL THERAPY 1/> REGIONS: CPT

## 2020-01-15 PROCEDURE — 97110 THERAPEUTIC EXERCISES: CPT

## 2020-01-15 NOTE — PROGRESS NOTES
Mai Bee  : 1955  Primary: [facility] Hmo/c*  Secondary:  [address] at [place] [address] [place] [address],[address], [address] [facility]  [phone]   [phone]       OUTPATIENT OCCUPATIONAL THERAPY: Daily Treatment Note 1/15/2020  Visit Count:  2    ICD-10: Treatment Diagnosis: Stiffness of right wrist, not elsewhere classified (M25.631)Pain in right wrist (M25.531)Pain in right hand (M79.641)Stiffness of right hand, not elsewhere classified (M25.641)  Precautions/Allergies:   Depo-medrol [methylprednisolone acetate]   TREATMENT PLAN:  Effective Dates: 2020 TO 2020 (90 days). Frequency/Duration: 2-3 times a week for 90 Day(s)  Pre-treatment Symptoms/Complaints: I am able to do my exercises. .  Pain: Initial: Pain Intensity 1: 3  Pain Location 1: Hand, Wrist  Pain Orientation 1: Right  Post Session:  3/10   Medications Last Reviewed:  1/15/2020   Updated Objective Findings:  None Today   TREATMENT:       Manual Therapy: (Soft Tissue Mobilization Duration  Duration: 15 Minutes  Duration: 15 Minutes): Technique: Connective tissue, Retrograde massage  Tissue Mobilized: Connective tissue(followed by PROM)  RUE Soft Tissue Mobilization: Yes  Technique: Retrograde massage  Tissue Mobilized: Scar/adhesion   Therapeutic Exercise:                                                                               : The patient was instructed in a home exercise program.                                                Date:  20 Date:  1/15/20 Date: Date: Date:   Activity/Exercise Parameters Parameters Parameters Parameters Parameters   AROM during Fluidotherapy  8 min MH      Paraffin with Stretch          Retrograde massage, Friction Scar massage, Joint Mobilization 15 min   25 min      Scarf Curl    5 min      Washer Game        Individual Gripper          Hand [employer]          Cones          Sup/pro roll    5 min      Clothes Pins          A-R [employer] Exerstick    5 min      Velcro-Roll          AROM Ex 30 min 15 min      RESISTIVE EXERCISES Weight/ Sets/Reps   Weight/ Sets/Reps Weight/ Sets/Reps Weight/ Sets/Reps Weight/ Sets/Reps   WEIGHT WELL        Sup/Pro        UD/RD        Wrist Flex/Ext        Free Weights          UBE(Minutes)          Nautilus        Compound Row        Vertical Chest        Overhead Press                    HEP: As above; handouts given to patient for all exercises. Therapeutic Modalities:      Right Wrist Heat  Type: Moist pack  Duration : 8 minutes  Patient Position: Sitting                                        Joint Mobilization:        Treatment Times:  · Therapeutic Exercise: 30 minutes  · Manual Therapy: 30 minutes  · Parafin:  minutes  · Whirlpool:  minutes  · Other:  minutes    Treatment/Session Summary:    · Response to Treatment:  Patients tolerated treatment well with no complications. Upon completion of treatment, skin condition was normal.  · Communication/Consultation:  None today  · Equipment provided today:  None today  · Recommendations/Intent for next treatment session: Next visit will focus on increasing motion and decreasing pain. .    Total Treatment Billable Duration:  60 minutes  OT Patient Time In/Time Out  Time In: 0100  Time Out: 0200  Roseann Galvin OT    Future Appointments   Date Time Provider Gaye Lagunas   1/20/2020  2:00 PM Rafael Franklin, OT SFEORPT SFE   1/22/2020  2:00 PM Rafael Franklin, OT [facility]PT [facility]   1/28/2020  2:00 PM Rafael Franklin, OT [facility]   1/29/2020  2:00 PM Rafeal Franklin, OT S[facility]PT S[facility]   1/31/2020  2:00 PM Rafael Franklin, OT S[facility]PT SFE

## 2020-01-20 ENCOUNTER — HOSPITAL ENCOUNTER (OUTPATIENT)
Dept: PHYSICAL THERAPY | Age: 65
Discharge: HOME | End: 2020-01-20
Payer: COMMERCIAL

## 2020-01-20 PROCEDURE — 97110 THERAPEUTIC EXERCISES: CPT

## 2020-01-20 PROCEDURE — 97022 WHIRLPOOL THERAPY: CPT

## 2020-01-20 PROCEDURE — 97140 MANUAL THERAPY 1/> REGIONS: CPT

## 2020-01-20 NOTE — PROGRESS NOTES
Rocio Green  : 1955  Primary: [address] [facility] Hmo/c*  Secondary:  [address] at [facility]  27[address] [facility], [address] [place] [address],[address], [place] [address].  [phone]   [phone]       OUTPATIENT OCCUPATIONAL THERAPY: Daily Treatment Note 2020  Visit Count:  3    ICD-10: Treatment Diagnosis: Stiffness of right wrist, not elsewhere classified (M25.631)Pain in right wrist (M25.531)Pain in right hand (M79.641)Stiffness of right hand, not elsewhere classified (M25.641)  Precautions/Allergies:   Depo-medrol [methylprednisolone acetate]   TREATMENT PLAN:  Effective Dates: 2020 TO 2020 (90 days). Frequency/Duration: 2-3 times a week for 90 Day(s)  Pre-treatment Symptoms/Complaints: I am moving better.   Pain: Initial: Pain Intensity 1: 3  Pain Location 1: Wrist, Hand  Pain Orientation 1: Right  Post Session:  3/10   Medications Last Reviewed:  2020   Updated Objective Findings:  None Today   TREATMENT:       Manual Therapy: (Soft Tissue Mobilization Duration  Duration: 15 Minutes  Duration: 15 Minutes): Technique: Connective tissue, Retrograde massage  Tissue Mobilized: Connective tissue  RUE Soft Tissue Mobilization: Yes  Technique: Retrograde massage, Connective tissue  Tissue Mobilized: Scar/adhesion   Therapeutic Exercise:                                                                               : The patient was instructed in a home exercise program.                                                Date:  20 Date:  1/15/20 Date:  20 Date: Date:   Activity/Exercise Parameters Parameters Parameters Parameters Parameters   AROM during Fluidotherapy  8 min MH 20 min     Paraffin with Stretch          Retrograde massage, Friction Scar massage, Joint Mobilization 15 min   25 min 15 min     Scarf Curl    5 min 5 min     Washer Game   3 min     Individual Gripper          Hand [place]          Cones          Sup/pro roll    5 min 5 min     Clothes Pins          A-R Bar          Exerstick    5 min 5 min     Velcro-Roll          AROM Ex 30 min 15 min 12 min     RESISTIVE EXERCISES Weight/ Sets/Reps   Weight/ Sets/Reps Weight/ Sets/Reps Weight/ Sets/Reps Weight/ Sets/Reps   WEIGHT WELL        Sup/Pro        UD/RD        Wrist Flex/Ext        Free Weights          UBE(Minutes)          Nautilus        Compound Row        Vertical Chest        Overhead Press                    HEP: As above; handouts given to patient for all exercises. Therapeutic Modalities:      Right Wrist Heat  Type: Fluidotherapy(while perfoming AROM ex)  Duration : 20 minutes  Patient Position: Sitting                                        Joint Mobilization:        Treatment Times:  · Therapeutic Exercise: 30 minutes  · Manual Therapy: 15 minutes  · Parafin:  minutes  · Whirlpool:15  minutes  · Other:  minutes    Treatment/Session Summary:    · Response to Treatment:  Patients tolerated treatment well with no complications. Upon completion of treatment, skin condition was normal.  · Communication/Consultation:  None today  · Equipment provided today:  None today  · Recommendations/Intent for next treatment session: Next visit will focus on increasing motion and decreasing pain. .    Total Treatment Billable Duration:  60 minutes  OT Patient Time In/Time Out  Time In: 0200  Time Out: 0300  Perry Sanchez OT    Future Appointments   Date Time Provider Gaye Lagunas   1/22/2020  2:00 PM LARA Martínez   1/28/2020  2:00 PM LARA Martínez   1/29/2020  2:00 PM LARA Martínez   1/31/2020  2:00 PM LARA Martínez

## 2020-01-22 ENCOUNTER — HOSPITAL ENCOUNTER (OUTPATIENT)
Dept: PHYSICAL THERAPY | Age: 65
Discharge: HOME | End: 2020-01-22
Payer: COMMERCIAL

## 2020-01-22 PROCEDURE — 97018 PARAFFIN BATH THERAPY: CPT

## 2020-01-22 PROCEDURE — 97140 MANUAL THERAPY 1/> REGIONS: CPT

## 2020-01-22 PROCEDURE — 97110 THERAPEUTIC EXERCISES: CPT

## 2020-01-22 NOTE — PROGRESS NOTES
Sandra Ennis  : 1955  Primary: [address] [facility] Hmo/c*  Secondary:  [address] [place]  at [facility]  27[address] [facility], [address] [place] [address] [address], [address] [facility]  [phone]   [phone]       OUTPATIENT OCCUPATIONAL THERAPY: Daily Treatment Note 2020  Visit Count:  4    ICD-10: Treatment Diagnosis: Stiffness of right wrist, not elsewhere classified (M25.631)Pain in right wrist (M25.531)Pain in right hand (M79.641)Stiffness of right hand, not elsewhere classified (M25.641)  Precautions/Allergies:   Depo-medrol [methylprednisolone acetate]   TREATMENT PLAN:  Effective Dates: 2020 TO 2020 (90 days). Frequency/Duration: 2-3 times a week for 90 Day(s)  Pre-treatment Symptoms/Complaints: The base of my thumb really hurts.   Pain: Initial: Pain Intensity 1: 3  Pain Location 1: Hand, Wrist  Pain Orientation 1: Right  Post Session:  3/10   Medications Last Reviewed:  2020   Updated Objective Findings:  None Today   TREATMENT:       Manual Therapy: (Soft Tissue Mobilization Duration  Duration: 15 Minutes  Duration: 15 Minutes): Technique: Retrograde massage  Tissue Mobilized: Connective tissue  RUE Soft Tissue Mobilization: Yes  Technique: Retrograde massage, Connective tissue(followed by PROM)  Tissue Mobilized: Scar/adhesion   Therapeutic Exercise:                                                                               : The patient was instructed in a home exercise program.                                                Date:  20 Date:  1/15/20 Date:  20 Date:  20 Date:   Activity/Exercise Parameters Parameters Parameters Parameters Parameters   AROM during Fluidotherapy  8 min MH 20 min 20 min    Paraffin with Stretch      15 min    Retrograde massage, Friction Scar massage, Joint Mobilization 15 min   25 min 15 min 15 min    Scarf Curl    5 min 5 min 5 min    Washer Game   3 min 3 min    Individual Gripper          Hand [place]      15 reps Cones          Sup/pro roll    5 min 5 min 5 min    Clothes Pins          A-R Bar          Exerstick    5 min 5 min 5 min    Velcro-Roll          AROM Ex 30 min 15 min 12 min 5 min    RESISTIVE EXERCISES Weight/ Sets/Reps   Weight/ Sets/Reps Weight/ Sets/Reps Weight/ Sets/Reps Weight/ Sets/Reps   WEIGHT WELL        Sup/Pro        UD/RD        Wrist Flex/Ext        Free Weights          UBE(Minutes)          Nautilus        Compound Row        Vertical Chest        Overhead Press                    HEP: As above; handouts given to patient for all exercises. Therapeutic Modalities:      Right Wrist Heat  Type: Paraffin bath(with a wrist extension stretch)  Duration : 15 minutes  Patient Position: Sitting                                        Joint Mobilization:        Treatment Times:  · Therapeutic Exercise: 30 minutes  · Manual Therapy: 15 minutes  · Parafin:15  minutes  · Whirlpool: minutes  · Other:  minutes    Treatment/Session Summary:    · Response to Treatment:  Patients tolerated treatment well with no complications. Upon completion of treatment, skin condition was normal.  · Communication/Consultation:  None today  · Equipment provided today:  None today  · Recommendations/Intent for next treatment session: Next visit will focus on increasing motion and decreasing pain. .    Total Treatment Billable Duration:  60 minutes  OT Patient Time In/Time Out  Time In: 0200  Time Out: 0300  Renate Matthews OT    Future Appointments   Date Time Provider Gaye Lagunas   1/28/2020  2:00 PM LARA Rodrigues   1/29/2020  2:00 PM LARA Rodrigues   1/31/2020  2:00 PM LARA RodriguesEORPDEBI BONILLA

## 2020-01-28 ENCOUNTER — HOSPITAL ENCOUNTER (OUTPATIENT)
Dept: PHYSICAL THERAPY | Age: 65
Discharge: HOME | End: 2020-01-28
Payer: COMMERCIAL

## 2020-01-28 PROCEDURE — 97018 PARAFFIN BATH THERAPY: CPT

## 2020-01-28 PROCEDURE — 97140 MANUAL THERAPY 1/> REGIONS: CPT

## 2020-01-28 PROCEDURE — 97110 THERAPEUTIC EXERCISES: CPT

## 2020-01-28 NOTE — PROGRESS NOTES
Jerardo Burn  : 1955  Primary: [address] [facility] [address] Hmo[address]*  Secondary:  [address]  at [facility], [address] [facility] [address], [facility] 91.  [phone]   [phone]       OUTPATIENT OCCUPATIONAL THERAPY: Daily Treatment Note 2020  Visit Count:  5    ICD-10: Treatment Diagnosis: Stiffness of right wrist, not elsewhere classified (M25.631)Pain in right wrist (M25.531)Pain in right hand (M79.641)Stiffness of right hand, not elsewhere classified (M25.641)  Precautions/Allergies:   Depo-medrol [methylprednisolone acetate]   TREATMENT PLAN:  Effective Dates: 2020 TO 2020 (90 days). Frequency/Duration: 2-3 times a week for 90 Day(s)  Pre-treatment Symptoms/Complaints: I am not having much pain.   Pain: Initial: Pain Intensity 1: 2  Pain Location 1: Wrist, Hand  Pain Orientation 1: Right  Post Session:  2/10   Medications Last Reviewed:  2020   Updated Objective Findings:  None Today   TREATMENT:       Manual Therapy: (Soft Tissue Mobilization Duration  Duration: 15 Minutes  Duration: 15 Minutes): Technique: Retrograde massage  Tissue Mobilized: Connective tissue  RUE Soft Tissue Mobilization: Yes  Technique: Retrograde massage, Connective tissue  Tissue Mobilized: Scar/adhesion   Therapeutic Exercise:                                                                               : The patient was instructed in a home exercise program.                                                Date:  20 Date:  1/15/20 Date:  20 Date:  20 Date:  20   Activity/Exercise Parameters Parameters Parameters Parameters Parameters   AROM during Fluidotherapy  8 min MH 20 min 20 min 20 min   Paraffin with Stretch      15 min 15 min   Retrograde massage, Friction Scar massage, Joint Mobilization 15 min   25 min 15 min 15 min 15 min   Scarf Curl    5 min 5 min 5 min 5 min   Washer Game   3 min 3 min 3 min   Individual Gripper          Hand [place] 15 reps 20 reps   Cones          Sup/pro roll    5 min 5 min 5 min 5 min   Clothes Pins          A-R Bar          Exerstick    5 min 5 min 5 min 5 min   Velcro-Roll          AROM Ex 30 min 15 min 12 min 5 min 3 min   RESISTIVE EXERCISES Weight/ Sets/Reps   Weight/ Sets/Reps Weight/ Sets/Reps Weight/ Sets/Reps Weight/ Sets/Reps   WEIGHT WELL        Sup/Pro        UD/RD        Wrist Flex/Ext        Free Weights          UBE(Minutes)          Nautilus        Compound Row        Vertical Chest        Overhead Press                    HEP: As above; handouts given to patient for all exercises. Therapeutic Modalities:      Right Wrist Heat  Type: Paraffin bath(with a wrist extension stretch)  Duration : 15 minutes  Patient Position: Sitting                                        Joint Mobilization:        Treatment Times:  · Therapeutic Exercise: 30 minutes  · Manual Therapy: 15 minutes  · Parafin:15  minutes  · Whirlpool: minutes  · Other:  minutes    Treatment/Session Summary:    · Response to Treatment:  Patients tolerated treatment well with no complications. Upon completion of treatment, skin condition was normal.  · Communication/Consultation:  None today  · Equipment provided today:  None today  · Recommendations/Intent for next treatment session: Next visit will focus on increasing motion and decreasing pain. .    Total Treatment Billable Duration:  60 minutes  OT Patient Time In/Time Out  Time In: 0200  Time Out: 0300  Ana Velasco OT    Future Appointments   Date Time Provider Gaye Lagunas   1/29/2020  2:00 PM LARA Pitts   1/31/2020  2:00 PM LARA Pitts

## 2020-01-29 ENCOUNTER — HOSPITAL ENCOUNTER (OUTPATIENT)
Dept: PHYSICAL THERAPY | Age: 65
Discharge: HOME | End: 2020-01-29
Payer: COMMERCIAL

## 2020-01-29 PROCEDURE — 97140 MANUAL THERAPY 1/> REGIONS: CPT

## 2020-01-29 PROCEDURE — 97110 THERAPEUTIC EXERCISES: CPT

## 2020-01-29 PROCEDURE — 97018 PARAFFIN BATH THERAPY: CPT

## 2020-01-29 NOTE — PROGRESS NOTES
Daria Adair  : 1955  Primary: [address] [facility] Hmo/c*  Secondary:  [address] at [facility]  [address] [facility], [address] [place] [address] 83[address], [facility] 91.  [phone]   [phone]       OUTPATIENT OCCUPATIONAL THERAPY: Daily Treatment Note 2020  Visit Count:  6    ICD-10: Treatment Diagnosis: Stiffness of right wrist, not elsewhere classified (M25.631)Pain in right wrist (M25.531)Pain in right hand (M79.641)Stiffness of right hand, not elsewhere classified (M25.641)  Precautions/Allergies:   Depo-medrol [methylprednisolone acetate]   TREATMENT PLAN:  Effective Dates: 2020 TO 2020 (90 days). Frequency/Duration: 2-3 times a week for 90 Day(s)  Pre-treatment Symptoms/Complaints: I am sore, I have been pushing my self more. Pain: Initial: Pain Intensity 1: 4  Pain Location 1: Hand, Wrist  Pain Orientation 1: Right  Post Session:  3/10   Medications Last Reviewed:  2020   Updated Objective Findings:  MD progress note completed.    TREATMENT:       Manual Therapy: (Soft Tissue Mobilization Duration  Duration: 15 Minutes  Duration: 15 Minutes): Technique: Connective tissue, Retrograde massage  Tissue Mobilized: Connective tissue(followed by PROM)  RUE Soft Tissue Mobilization: Yes  Technique: Retrograde massage, Connective tissue(followed by PROM)  Tissue Mobilized: Scar/adhesion   Therapeutic Exercise:                                                                               : The patient was instructed in a home exercise program.                                                Date:  20 Date:  1/15/20 Date:  20 Date:  20 Date:  20   Activity/Exercise Parameters Parameters Parameters Parameters Parameters   AROM during Fluidotherapy 20 min 8 min MH 20 min 20 min 20 min   Paraffin with Stretch 15 min  sup     15 min 15 min   Retrograde massage, Friction Scar massage, Joint Mobilization 15 min   25 min 15 min 15 min 15 min   Scarf Curl 3 min   5 min 5 min 5 min 5 min   Washer Game 3 min  3 min 3 min 3 min   Individual Gripper          Hand [place] 20 reps     15 reps 20 reps   Cones          Sup/pro roll   5 min 5 min 5 min 5 min 5 min   Clothes Pins          A-R Bar          Exerstick 5 min   5 min 5 min 5 min 5 min   Velcro-Roll          AROM Ex 3 min 15 min 12 min 5 min 3 min   RESISTIVE EXERCISES Weight/ Sets/Reps   Weight/ Sets/Reps Weight/ Sets/Reps Weight/ Sets/Reps Weight/ Sets/Reps   WEIGHT WELL        Sup/Pro        UD/RD        Wrist Flex/Ext        Free Weights          UBE(Minutes)          Nautilus        Compound Row        Vertical Chest        Overhead Press                    HEP: As above; handouts given to patient for all exercises. Therapeutic Modalities:      Right Wrist Heat  Type: Paraffin bath(with a wrist sup stretch)  Duration : 15 minutes  Patient Position: Sitting                                        Joint Mobilization:        Treatment Times:  · Therapeutic Exercise: 30 minutes  · Manual Therapy: 15 minutes  · Parafin:15  minutes  · Whirlpool: minutes  · Other:  minutes    Treatment/Session Summary:    · Response to Treatment:  Patients tolerated treatment well with no complications. Upon completion of treatment, skin condition was normal.  · Communication/Consultation:  MD progress note completed. · Equipment provided today:  None today  · Recommendations/Intent for next treatment session: Next visit will focus on increasing motion and decreasing pain. .    Total Treatment Billable Duration:  60 minutes  OT Patient Time In/Time Out  Time In: 0200  Time Out: 0300  Renate Matthews OT    Future Appointments   Date Time Provider Gaye Lagunas   1/31/2020  2:00 PM Esthela Wolfe OT [facility]

## 2020-01-31 ENCOUNTER — HOSPITAL ENCOUNTER (OUTPATIENT)
Dept: PHYSICAL THERAPY | Age: 65
Discharge: HOME | End: 2020-01-31
Payer: COMMERCIAL

## 2020-01-31 PROCEDURE — 97110 THERAPEUTIC EXERCISES: CPT

## 2020-01-31 PROCEDURE — 97140 MANUAL THERAPY 1/> REGIONS: CPT

## 2020-01-31 PROCEDURE — 97018 PARAFFIN BATH THERAPY: CPT

## 2020-01-31 NOTE — PROGRESS NOTES
Leon Avitia  : 1955  Primary: [address] [facility] [address]mo[address]c*  Secondary:  [address] at [facility]  [address] [facility], [address] [place] [address] 83[address], [facility] 91.  [phone]   [phone]       OUTPATIENT OCCUPATIONAL THERAPY: Daily Treatment Note 2020  Visit Count:  7    ICD-10: Treatment Diagnosis: Stiffness of right wrist, not elsewhere classified (M25.631)Pain in right wrist (M25.531)Pain in right hand (M79.641)Stiffness of right hand, not elsewhere classified (M25.641)  Precautions/Allergies:   Depo-medrol [methylprednisolone acetate]   TREATMENT PLAN:  Effective Dates: 2020 TO 2020 (90 days). Frequency/Duration: 2-3 times a week for 90 Day(s)  Pre-treatment Symptoms/Complaints: MY doctor did not say much. .  Pain: Initial: Pain Intensity 1: 2  Pain Location 1: Hand, Wrist  Pain Orientation 1: Right  Post Session:  3/10   Medications Last Reviewed:  2020   Updated Objective Findings:  None Today   TREATMENT:       Manual Therapy: (Soft Tissue Mobilization Duration  Duration: 15 Minutes  Duration: 15 Minutes): Technique: Connective tissue, Retrograde massage(followed by PROM)  Tissue Mobilized: Connective tissue  RUE Soft Tissue Mobilization: Yes  Technique: Retrograde massage, Connective tissue(followed by PROM)  Tissue Mobilized: Scar/adhesion   Therapeutic Exercise:                                                                               : The patient was instructed in a home exercise program.                                                Date:  20 Date:  20 Date:  20 Date:  20 Date:  20   Activity/Exercise Parameters Parameters Parameters Parameters Parameters   AROM during Fluidotherapy 20 min 20 min 20 min 20 min 20 min   Paraffin with Stretch 15 min  sup   15 min  sup  15 min 15 min   Retrograde massage, Friction Scar massage, Joint Mobilization 15 min   25 min 15 min 15 min 15 min   Scarf Curl 3 min   5 min 5 min 5 min 5 min   Washer Game 3 min 3  min 3 min 3 min 3 min   Individual Gripper          Hand [place] 20 reps   20 reps  15 reps 20 reps   Cones          Sup/pro roll   5 min 5 min 5 min 5 min 5 min   Clothes Pins          A-R Bar          Exerstick 5 min   5 min 5 min 5 min 5 min   Velcro-Roll          A&PROM Ex 3 min 5 min 12 min 5 min 3 min   RESISTIVE EXERCISES Weight/ Sets/Reps   Weight/ Sets/Reps Weight/ Sets/Reps Weight/ Sets/Reps Weight/ Sets/Reps   WEIGHT WELL        Sup/Pro        UD/RD        Wrist Flex/Ext        Free Weights          UBE(Minutes)          Nautilus        Compound Row        Vertical Chest        Overhead Press                    HEP: As above; handouts given to patient for all exercises. Therapeutic Modalities:      Right Wrist Heat  Type: Paraffin bath(with a supination stretch)  Duration : 15 minutes  Patient Position: Sitting                                        Joint Mobilization:        Treatment Times:  · Therapeutic Exercise: 30 minutes  · Manual Therapy: 15 minutes  · Parafin:15  minutes  · Whirlpool: minutes  · Other:  minutes    Treatment/Session Summary:    · Response to Treatment:  Patients tolerated treatment well with no complications. Upon completion of treatment, skin condition was normal.  · Communication/Consultation:  MD progress note completed. · Equipment provided today:  None today  · Recommendations/Intent for next treatment session: Next visit will focus on increasing motion and decreasing pain. .    Total Treatment Billable Duration:  60 minutes  OT Patient Time In/Time Out  Time In: 0200  Time Out: 0300  Mook Nielsen OT    Future Appointments   Date Time Provider Gaye Lagunas   2/3/2020  2:00 PM Glenetta Pries, OT [facility]   2/4/2020  2:00 PM Glenetta Pries, OT [facility]   2/6/2020  2:30 PM Glenetta Pries, OT [facility]   2/10/2020  2:00 PM Glenetta Pries, OT [facility]   2/11/2020  2:00 PM Glenetta Pries, OT [facility] S[facility]   2/13/2020  3:30 PM Crystal Falk, OT [facility]

## 2020-02-03 ENCOUNTER — HOSPITAL ENCOUNTER (OUTPATIENT)
Dept: PHYSICAL THERAPY | Age: 65
Discharge: HOME | End: 2020-02-03
Payer: COMMERCIAL

## 2020-02-03 PROCEDURE — 97140 MANUAL THERAPY 1/> REGIONS: CPT

## 2020-02-03 PROCEDURE — 97018 PARAFFIN BATH THERAPY: CPT

## 2020-02-03 PROCEDURE — 97110 THERAPEUTIC EXERCISES: CPT

## 2020-02-03 NOTE — PROGRESS NOTES
Eun Lopez  : 1955  Primary: [address] [facility] [address]c*  Secondary:  [address]  at [place] [address] [facility], [place] [address].  [phone]   [phone]       OUTPATIENT OCCUPATIONAL THERAPY: Daily Treatment Note 2/3/2020  Visit Count:  8    ICD-10: Treatment Diagnosis: Stiffness of right wrist, not elsewhere classified (M25.631)Pain in right wrist (M25.531)Pain in right hand (M79.641)Stiffness of right hand, not elsewhere classified (M25.641)  Precautions/Allergies:   Depo-medrol [methylprednisolone acetate]   TREATMENT PLAN:  Effective Dates: 2020 TO 2020 (90 days). Frequency/Duration: 2-3 times a week for 90 Day(s)  Pre-treatment Symptoms/Complaints:I am working really hard. .  Pain: Initial: Pain Intensity 1: 3  Pain Location 1: Hand, Wrist  Pain Orientation 1: Right  Post Session:  3/10   Medications Last Reviewed:  2/3/2020   Updated Objective Findings:  None Today   TREATMENT:       Manual Therapy: (Soft Tissue Mobilization Duration  Duration: 15 Minutes  Duration: 15 Minutes): Technique: Connective tissue, Retrograde massage  Tissue Mobilized: Connective tissue  RUE Soft Tissue Mobilization: Yes  Technique: Retrograde massage, Connective tissue(followed by PROM)  Tissue Mobilized: Scar/adhesion   Therapeutic Exercise:                                                                               : The patient was instructed in a home exercise program.                                                Date:  20 Date:  20 Date:  2/3/20 Date:  20 Date:  20   Activity/Exercise Parameters Parameters Parameters Parameters Parameters   AROM during Fluidotherapy 20 min 20 min 20 min 20 min 20 min   Paraffin with Stretch 15 min  sup   15 min  sup 15 min  sup 15 min 15 min   Retrograde massage, Friction Scar massage, Joint Mobilization 15 min   25 min 15 min 15 min 15 min   Scarf Curl 3 min   5 min 5 min 5 min 5 min Washer Game 3 min 3  min 3 min 3 min 3 min   Individual Gripper          Hand [place] 20 reps   20 reps 20 reps 15 reps 20 reps   Cones          Sup/pro roll   5 min 5 min 5 min 5 min 5 min   Clothes Pins          A-R Bar          Exerstick 5 min   5 min 5 min 5 min 5 min   Velcro-Roll          A&PROM Ex 3 min 5 min 5 min 5 min 3 min   RESISTIVE EXERCISES Weight/ Sets/Reps   Weight/ Sets/Reps Weight/ Sets/Reps Weight/ Sets/Reps Weight/ Sets/Reps   WEIGHT WELL        Sup/Pro        UD/RD        Wrist Flex/Ext        Free Weights          UBE(Minutes)          Nautilus        Compound Row        Vertical Chest        Overhead Press                    HEP: As above; handouts given to patient for all exercises. Therapeutic Modalities:      Right Wrist Heat  Type: Paraffin bath(with a supination stretch)  Duration : 15 minutes  Patient Position: Sitting                                        Joint Mobilization:        Treatment Times:  · Therapeutic Exercise: 30 minutes  · Manual Therapy: 15 minutes  · Parafin:15  minutes  · Whirlpool: minutes  · Other:  minutes    Treatment/Session Summary:    · Response to Treatment:  Patients tolerated treatment well with no complications. Upon completion of treatment, skin condition was normal.  · Communication/Consultation:  MD progress note completed. · Equipment provided today:  None today  · Recommendations/Intent for next treatment session: Next visit will focus on increasing motion and decreasing pain. .    Total Treatment Billable Duration:  60 minutes  OT Patient Time In/Time Out  Time In: 0200  Time Out: 0300  Kylee Chao OT    Future Appointments   Date Time Provider Gaye Rami   2/4/2020  2:00 PM Maryann Clayton OT [facility]   2/6/2020  2:30 PM Maryann Clayton OT [facility]   2/10/2020  2:00 PM Maryann Clayton OT [facility]   2/11/2020  2:00 PM Maryann Clayton OT [facility]   2/13/2020  3:30 PM Maryann Clayton, OT [facility]

## 2020-02-04 ENCOUNTER — HOSPITAL ENCOUNTER (OUTPATIENT)
Dept: PHYSICAL THERAPY | Age: 65
Discharge: HOME | End: 2020-02-04
Payer: COMMERCIAL

## 2020-02-04 PROCEDURE — 97140 MANUAL THERAPY 1/> REGIONS: CPT

## 2020-02-04 PROCEDURE — 97018 PARAFFIN BATH THERAPY: CPT

## 2020-02-04 PROCEDURE — 97110 THERAPEUTIC EXERCISES: CPT

## 2020-02-04 NOTE — PROGRESS NOTES
Salvatore Renato  : 1955  Primary: [address] [facility] Hmo/c*  Secondary:  [address]  at [place] [address] [facility], [address] [place] [address],[address], [place] [address].  [phone]   [phone]       OUTPATIENT OCCUPATIONAL THERAPY: Daily Treatment Note 2020  Visit Count:  9    ICD-10: Treatment Diagnosis: Stiffness of right wrist, not elsewhere classified (M25.631)Pain in right wrist (M25.531)Pain in right hand (M79.641)Stiffness of right hand, not elsewhere classified (M25.641)  Precautions/Allergies:   Depo-medrol [methylprednisolone acetate]   TREATMENT PLAN:  Effective Dates: 2020 TO 2020 (90 days). Frequency/Duration: 2-3 times a week for 90 Day(s)  Pre-treatment Symptoms/Complaints:I was a little sore after everything yesterday. .  Pain: Initial: Pain Intensity 1: 3(increasing to 8 with PROM)  Pain Location 1: Hand, Wrist  Pain Orientation 1: Right  Post Session:  3/10   Medications Last Reviewed:  2020   Updated Objective Findings:  None Today   TREATMENT:       Manual Therapy: (Soft Tissue Mobilization Duration  Duration: 15 Minutes  Duration: 15 Minutes): Technique: Connective tissue, Retrograde massage(followed by PROM)  Tissue Mobilized: Connective tissue  RUE Soft Tissue Mobilization: Yes  Technique: Retrograde massage, Connective tissue(followed by PROM)  Tissue Mobilized: Scar/adhesion   Therapeutic Exercise:                                                                               : The patient was instructed in a home exercise program.                                                Date:  20 Date:  20 Date:  2/3/20 Date:  20 Date:  20   Activity/Exercise Parameters Parameters Parameters Parameters Parameters   AROM during Fluidotherapy 20 min 20 min 20 min 20 min 20 min   Paraffin with Stretch 15 min  sup   15 min  sup 15 min  sup 15 min  sup 15 min   Retrograde massage, Friction Scar massage, Joint Mobilization 15 min   25 min 15 min 15 min 15 min   Scarf Curl 3 min   5 min 5 min 5 min 5 min   Washer Game 3 min 3  min 3 min 3 min 3 min   Individual Gripper          Hand [place] 20 reps   20 reps 20 reps 20 reps 20 reps   Cones          Sup/pro roll   5 min 5 min 5 min 5 min 5 min   Clothes Pins      15 reps    A-R Bar          Exerstick 5 min   5 min 5 min 5 min 5 min   Velcro-Roll          A&PROM Ex 3 min 5 min 5 min 3 min 3 min   RESISTIVE EXERCISES Weight/ Sets/Reps   Weight/ Sets/Reps Weight/ Sets/Reps Weight/ Sets/Reps Weight/ Sets/Reps   WEIGHT WELL        Sup/Pro        UD/RD        Wrist Flex/Ext        Free Weights          UBE(Minutes)          Nautilus        Compound Row        Vertical Chest        Overhead Press                    HEP: As above; handouts given to patient for all exercises. Therapeutic Modalities:      Right Wrist Heat  Type: Paraffin bath(with a supination stretch)  Duration : 15 minutes  Patient Position: Sitting                                        Joint Mobilization:        Treatment Times:  · Therapeutic Exercise: 30 minutes  · Manual Therapy: 15 minutes  · Parafin:15  minutes  · Whirlpool: minutes  · Other:  minutes    Treatment/Session Summary:    · Response to Treatment:  Patients tolerated treatment well with no complications. Upon completion of treatment, skin condition was normal.  · Communication/Consultation:  None today  · Equipment provided today:  None today  · Recommendations/Intent for next treatment session: Next visit will focus on increasing motion and decreasing pain. .    Total Treatment Billable Duration:  60 minutes  OT Patient Time In/Time Out  Time In: 0200  Time Out: 0300  Mort Rank, OT    Future Appointments   Date Time Provider Gaye Lagunas   2/6/2020  2:30 PM Gary Head, OT [facility]   2/10/2020  2:00 PM Gary Head, OT [facility]   2/11/2020  2:00 PM Gary Head, OT [facility] SFE   2/13/2020  3:30 PM Gary Head, OT [facility] SFE

## 2020-02-06 ENCOUNTER — HOSPITAL ENCOUNTER (OUTPATIENT)
Dept: PHYSICAL THERAPY | Age: 65
Discharge: HOME | End: 2020-02-06
Payer: COMMERCIAL

## 2020-02-06 PROCEDURE — 97018 PARAFFIN BATH THERAPY: CPT

## 2020-02-06 PROCEDURE — 97140 MANUAL THERAPY 1/> REGIONS: CPT

## 2020-02-06 PROCEDURE — 97110 THERAPEUTIC EXERCISES: CPT

## 2020-02-06 NOTE — PROGRESS NOTES
Eun Lopez  : 1955  Primary: [address] [facility] Hmo/c*  Secondary:  [address]  at [place] [address], [place] [address].  [phone]   [phone]       OUTPATIENT OCCUPATIONAL THERAPY: Daily Treatment Note 2020  Visit Count:  10    ICD-10: Treatment Diagnosis: Stiffness of right wrist, not elsewhere classified (M25.631)Pain in right wrist (M25.531)Pain in right hand (M79.641)Stiffness of right hand, not elsewhere classified (M25.641)  Precautions/Allergies:   Depo-medrol [methylprednisolone acetate]   TREATMENT PLAN:  Effective Dates: 2020 TO 2020 (90 days). Frequency/Duration: 2-3 times a week for 90 Day(s)  Pre-treatment Symptoms/Complaints:I am doing well with my putty. .  Pain: Initial: Pain Intensity 1: 3  Pain Location 1: Hand, Wrist  Pain Orientation 1: Right  Post Session:  3/10   Medications Last Reviewed:  2020   Updated Objective Findings:  None Today   TREATMENT:       Manual Therapy: (Soft Tissue Mobilization Duration  Duration: 15 Minutes  Duration: 15 Minutes): Technique: Retrograde massage, Connective tissue(followed by PROM)  Tissue Mobilized: Connective tissue  RUE Soft Tissue Mobilization: Yes  Technique: Retrograde massage, Connective tissue(followed by PROM)  Tissue Mobilized: Scar/adhesion   Therapeutic Exercise:                                                                               : The patient was instructed in a home exercise program.                                                Date:  20 Date:  20 Date:  2/3/20 Date:  20 Date:  20   Activity/Exercise Parameters Parameters Parameters Parameters Parameters   AROM during Fluidotherapy 20 min 20 min 20 min 20 min 20 min   Paraffin with Stretch 15 min  sup   15 min  sup 15 min  sup 15 min  sup 15 min   Retrograde massage, Friction Scar massage, Joint Mobilization 15 min   25 min 15 min 15 min 15 min   Scarf Curl 3 min   5 min 5 min 5 min 5 min   Washer Game 3 min 3  min 3 min 3 min 3 min   Individual Gripper          Hand [place] 20 reps   20 reps 20 reps 20 reps 20 reps   Cones          Sup/pro roll   5 min 5 min 5 min 5 min 5 min   Clothes Pins      15 reps 20 reps   A-R Bar          Exerstick 5 min   5 min 5 min 5 min 5 min   Velcro-Roll          A&PROM Ex 3 min 5 min 5 min 3 min 3 min   RESISTIVE EXERCISES Weight/ Sets/Reps   Weight/ Sets/Reps Weight/ Sets/Reps Weight/ Sets/Reps Weight/ Sets/Reps   WEIGHT WELL        Sup/Pro        UD/RD        Wrist Flex/Ext        Free Weights          UBE(Minutes)          Nautilus        Compound Row        Vertical Chest        Overhead Press                    HEP: As above; handouts given to patient for all exercises. Therapeutic Modalities:      Right Wrist Heat  Type: Paraffin bath(with a supination stretch)  Duration : 15 minutes  Patient Position: Sitting                                        Joint Mobilization:        Treatment Times:  · Therapeutic Exercise: 30 minutes  · Manual Therapy: 15 minutes  · Parafin:15  minutes  · Whirlpool: minutes  · Other:  minutes    Treatment/Session Summary:    · Response to Treatment:  Patients tolerated treatment well with no complications. Upon completion of treatment, skin condition was normal.  · Communication/Consultation:  None today  · Equipment provided today:  None today  · Recommendations/Intent for next treatment session: Next visit will focus on increasing motion and decreasing pain. .    Total Treatment Billable Duration:  60 minutes  OT Patient Time In/Time Out  Time In: 0230  Time Out: 0330  Mathew Brannon OT    Future Appointments   Date Time Provider Gaye Lagunas   2/10/2020  2:00 PM [facility]   2/11/2020  2:00 PM [place] Izabela OT [facility] SFE   2/13/2020  3:30 PM [place] LARA [place]rola [facility]

## 2020-02-10 ENCOUNTER — HOSPITAL ENCOUNTER (OUTPATIENT)
Dept: PHYSICAL THERAPY | Age: 65
Discharge: HOME | End: 2020-02-10
Payer: COMMERCIAL

## 2020-02-10 PROCEDURE — 97140 MANUAL THERAPY 1/> REGIONS: CPT

## 2020-02-10 PROCEDURE — 97110 THERAPEUTIC EXERCISES: CPT

## 2020-02-10 PROCEDURE — 97018 PARAFFIN BATH THERAPY: CPT

## 2020-02-10 NOTE — PROGRESS NOTES
Sergey Moreira  : 1955  Primary: [address] [facility] H[facility]*  Secondary:  [address] [facility]  [address] [facility], [address] [place] [address],[address] [facility], [place] [address].  [phone]   [phone]       OUTPATIENT OCCUPATIONAL THERAPY: Daily Treatment Note 2/10/2020  Visit Count:  11    ICD-10: Treatment Diagnosis: Stiffness of right wrist, not elsewhere classified (M25.631)Pain in right wrist (M25.531)Pain in right hand (M79.641)Stiffness of right hand, not elsewhere classified (M25.641)  Precautions/Allergies:   Depo-medrol [methylprednisolone acetate]   TREATMENT PLAN:  Effective Dates: 2020 TO 2020 (90 days). Frequency/Duration: 2-3 times a week for 90 Day(s)  Pre-treatment Symptoms/Complaints:I am going to see a doctors about my shoulder.   Pain: Initial: Pain Intensity 1: 2  Pain Location 1: Hand, Wrist  Pain Orientation 1: Right  Post Session:  3/10   Medications Last Reviewed:  2/10/2020   Updated Objective Findings:  None Today   TREATMENT:       Manual Therapy: (Soft Tissue Mobilization Duration  Duration: 15 Minutes  Duration: 15 Minutes): Technique: Connective tissue, Retrograde massage  Tissue Mobilized: Connective tissue  RUE Soft Tissue Mobilization: Yes  Technique: Retrograde massage, Connective tissue(followed by PROM)  Tissue Mobilized: Scar/adhesion   Therapeutic Exercise:                                                                               : The patient was instructed in a home exercise program.                                                Date:  2/10/20 Date:  20 Date:  2/3/20 Date:  20 Date:  20   Activity/Exercise Parameters Parameters Parameters Parameters Parameters   AROM during Fluidotherapy 20 min 20 min 20 min 20 min 20 min   Paraffin with Stretch 15 min  sup   15 min  sup 15 min  sup 15 min  sup 15 min   Retrograde massage, Friction Scar massage, Joint Mobilization 15 min   25 min 15 min 15 min 15 min   Scarf Curl 3 min   5 min 5 min 5 min 5 min   Washer Game 3 min 3  min 3 min 3 min 3 min   Individual Gripper          Hand [place] 20 reps   20 reps 20 reps 20 reps 20 reps   Cones          Sup/pro roll   5 min 5 min 5 min 5 min 5 min   Clothes Pins   20 reps   15 reps 20 reps   A-R Bar          Exerstick 5 min   5 min 5 min 5 min 5 min   Velcro-Roll          A&PROM Ex 3 min 5 min 5 min 3 min 3 min   RESISTIVE EXERCISES Weight/ Sets/Reps   Weight/ Sets/Reps Weight/ Sets/Reps Weight/ Sets/Reps Weight/ Sets/Reps   WEIGHT WELL        Sup/Pro        UD/RD        Wrist Flex/Ext        Free Weights          UBE(Minutes)          Nautilus        Compound Row        Vertical Chest        Overhead Press                    HEP: As above; handouts given to patient for all exercises. Therapeutic Modalities:      Right Wrist Heat  Type: Paraffin bath(with a supination stretch)  Duration : 15 minutes  Patient Position: Sitting                                        Joint Mobilization:        Treatment Times:  · Therapeutic Exercise: 30 minutes  · Manual Therapy: 15 minutes  · Parafin:15  minutes  · Whirlpool: minutes  · Other:  minutes    Treatment/Session Summary:    · Response to Treatment:  Patients tolerated treatment well with no complications. Upon completion of treatment, skin condition was normal.  · Communication/Consultation:  None today  · Equipment provided today:  None today  · Recommendations/Intent for next treatment session: Next visit will focus on increasing motion and decreasing pain. .    Total Treatment Billable Duration:  60 minutes  OT Patient Time In/Time Out  Time In: 0200  Time Out: 0300  John Jose OT    Future Appointments   Date Time Provider Gaye Lagunas   2/11/2020  2:00 PM Susu Barcenas OT [facility] IRENE   2/13/2020  3:30 PM LARA Aragon EMIL

## 2020-02-11 ENCOUNTER — HOSPITAL ENCOUNTER (OUTPATIENT)
Dept: PHYSICAL THERAPY | Age: 65
Discharge: HOME | End: 2020-02-11
Payer: COMMERCIAL

## 2020-02-11 PROCEDURE — 97140 MANUAL THERAPY 1/> REGIONS: CPT

## 2020-02-11 PROCEDURE — 97110 THERAPEUTIC EXERCISES: CPT

## 2020-02-11 PROCEDURE — 97018 PARAFFIN BATH THERAPY: CPT

## 2020-02-11 NOTE — PROGRESS NOTES
Cami Ham  : 1955  Primary: [address] [facility] Hmo/c*  Secondary:  [address] at [address] [facility], [address] [facility],[address], [address].  [phone]   [phone]       OUTPATIENT OCCUPATIONAL THERAPY: Daily Treatment Note 2020  Visit Count:  12    ICD-10: Treatment Diagnosis: Stiffness of right wrist, not elsewhere classified (M25.631)Pain in right wrist (M25.531)Pain in right hand (M79.641)Stiffness of right hand, not elsewhere classified (M25.641)  Precautions/Allergies:   Depo-medrol [methylprednisolone acetate]   TREATMENT PLAN:  Effective Dates: 2020 TO 2020 (90 days). Frequency/Duration: 2-3 times a week for 90 Day(s)  Pre-treatment Symptoms/Complaints:I am did all my exercises this morning.   Pain: Initial: Pain Intensity 1: 3  Pain Location 1: Hand, Wrist  Pain Orientation 1: Right  Post Session:  3/10   Medications Last Reviewed:  2020   Updated Objective Findings:  None Today   TREATMENT:       Manual Therapy: (Soft Tissue Mobilization Duration  Duration: 15 Minutes  Duration: 15 Minutes): Technique: Retrograde massage  Tissue Mobilized: Connective tissue  RUE Soft Tissue Mobilization: Yes  Technique: Retrograde massage, Connective tissue(followed by PROM)  Tissue Mobilized: Scar/adhesion   Therapeutic Exercise:                                                                               : The patient was instructed in a home exercise program.                                                Date:  2/10/20 Date:  20 Date:  2/3/20 Date:  20 Date:  20   Activity/Exercise Parameters Parameters Parameters Parameters Parameters   AROM during Fluidotherapy 20 min 20 min 20 min 20 min 20 min   Paraffin with Stretch 15 min  sup   15 min  sup 15 min  sup 15 min  sup 15 min   Retrograde massage, Friction Scar massage, Joint Mobilization 15 min   15 min 15 min 15 min 15 min   Scarf Curl 3 min   5 min 5 min 5 min 5 min Washer Game 3 min 3  min 3 min 3 min 3 min   Individual Gripper          Hand [place] 20 reps   20 reps 20 reps 20 reps 20 reps   Cones          Sup/pro roll   5 min 5 min 5 min 5 min 5 min   Clothes Pins   20 reps 20 reps  15 reps 20 reps   A-R Bar          Exerstick 5 min   5 min 5 min 5 min 5 min   Velcro-Roll          A&PROM Ex 3 min 5 min 5 min 3 min 3 min   RESISTIVE EXERCISES Weight/ Sets/Reps   Weight/ Sets/Reps Weight/ Sets/Reps Weight/ Sets/Reps Weight/ Sets/Reps   WEIGHT WELL        Sup/Pro        UD/RD        Wrist Flex/Ext        Free Weights          UBE(Minutes)          Nautilus        Compound Row        Vertical Chest        Overhead Press                    HEP: As above; handouts given to patient for all exercises. Therapeutic Modalities:      Right Wrist Heat  Type: Paraffin bath(with a supination stretch)  Duration : 15 minutes  Patient Position: Sitting                                        Joint Mobilization:        Treatment Times:  · Therapeutic Exercise: 30 minutes  · Manual Therapy: 15 minutes  · Parafin:15  minutes  · Whirlpool: minutes  · Other:  minutes    Treatment/Session Summary:    · Response to Treatment:  Patients tolerated treatment well with no complications. Upon completion of treatment, skin condition was normal.  · Communication/Consultation:  None today  · Equipment provided today:  None today  · Recommendations/Intent for next treatment session: Next visit will focus on increasing motion and decreasing pain. .    Total Treatment Billable Duration:  60 minutes  OT Patient Time In/Time Out  Time In: 0200  Time Out: 0300  Diamond Oviedo OT    Future Appointments   Date Time Provider Gaye Lagunas   2/13/2020  3:30 PM Benita Nogueiras, OT [facility] SFE   2/18/2020  2:00 PM Locandace Pears, OT [facility] SFE   2/21/2020  2:00 PM Locandace Pears, OT [facility] SFE   2/24/2020  2:00 PM Locandace Pears, OT [facility] SFE   2/25/2020  2:00 PM Lomarlolen Pears, OT [facility] SFE 2/27/2020  2:30 PM LARA ReaORPDEBI RHODESE

## 2020-02-13 ENCOUNTER — HOSPITAL ENCOUNTER (OUTPATIENT)
Dept: PHYSICAL THERAPY | Age: 65
Discharge: HOME | End: 2020-02-13
Payer: COMMERCIAL

## 2020-02-13 PROCEDURE — 97018 PARAFFIN BATH THERAPY: CPT

## 2020-02-13 PROCEDURE — 97110 THERAPEUTIC EXERCISES: CPT

## 2020-02-13 PROCEDURE — 97140 MANUAL THERAPY 1/> REGIONS: CPT

## 2020-02-13 NOTE — PROGRESS NOTES
Cami Ham  : 1955  Primary: [address] [facility] Hmo[facility]*  Secondary:  [address]  at [facility], [address] [place] [address],[address], [place] [address].  [phone]   [phone]       OUTPATIENT OCCUPATIONAL THERAPY: Daily Treatment Note 2020  Visit Count:  13    ICD-10: Treatment Diagnosis: Stiffness of right wrist, not elsewhere classified (M25.631)Pain in right wrist (M25.531)Pain in right hand (M79.641)Stiffness of right hand, not elsewhere classified (M25.641)  Precautions/Allergies:   Depo-medrol [methylprednisolone acetate]   TREATMENT PLAN:  Effective Dates: 2020 TO 2020 (90 days).   Frequency/Duration: 2-3 times a week for 90 Day(s)  Pre-treatment Symptoms/Complaints:I am just a little sore  Pain: Initial: Pain Intensity 1: 2  Pain Location 1: Wrist, Hand  Pain Orientation 1: Right  Post Session:  3/10   Medications Last Reviewed:  2020   Updated Objective Findings:  None Today   TREATMENT:       Manual Therapy: (Soft Tissue Mobilization Duration  Duration: 15 Minutes  Duration: 15 Minutes): Technique: Retrograde massage, Connective tissue  RUE Soft Tissue Mobilization: Yes  Technique: Connective tissue(followed by PROM)  Tissue Mobilized: Scar/adhesion   Therapeutic Exercise:                                                                               : The patient was instructed in a home exercise program.                                                Date:  2/10/20 Date:  20 Date:  20 Date:  20 Date:  20   Activity/Exercise Parameters Parameters Parameters Parameters Parameters   AROM during Fluidotherapy 20 min 20 min 20 min 20 min 20 min   Paraffin with Stretch 15 min  sup   15 min  sup 15 min  sup 15 min  sup 15 min   Retrograde massage, Friction Scar massage, Joint Mobilization 15 min   15 min 15 min 15 min 15 min   Scarf Curl 3 min   5 min 5 min 5 min 5 min   Washer Game 3 min 3  min 3 min 3 min 3 min   Individual Gripper          Hand [place] 20 reps   20 reps 20 reps 20 reps 20 reps   Cones          Sup/pro roll   5 min 5 min 5 min 5 min 5 min   Clothes Pins   20 reps 20 reps 20 reps 15 reps 20 reps   A-R Bar          Exerstick 5 min   5 min 5 min 5 min 5 min   Velcro-Roll          A&PROM Ex 3 min 5 min 5 min 3 min 3 min   RESISTIVE EXERCISES Weight/ Sets/Reps   Weight/ Sets/Reps Weight/ Sets/Reps Weight/ Sets/Reps Weight/ Sets/Reps   WEIGHT WELL        Sup/Pro        UD/RD        Wrist Flex/Ext        Free Weights          UBE(Minutes)          Nautilus        Compound Row        Vertical Chest        Overhead Press                    HEP: As above; handouts given to patient for all exercises. Therapeutic Modalities:      Right Wrist Heat  Type: Paraffin bath(with a supination stretch)  Duration : 15 minutes  Patient Position: Sitting                                        Joint Mobilization:        Treatment Times:  · Therapeutic Exercise: 30 minutes  · Manual Therapy: 15 minutes  · Parafin:15  minutes  · Whirlpool: minutes  · Other:  minutes    Treatment/Session Summary:    · Response to Treatment:  Patients tolerated treatment well with no complications. Upon completion of treatment, skin condition was normal.  · Communication/Consultation:  None today  · Equipment provided today:  None today  · Recommendations/Intent for next treatment session: Next visit will focus on increasing motion and decreasing pain. .    Total Treatment Billable Duration:  60 minutes  OT Patient Time In/Time Out  Time In: 0330  Time Out: 0430  Olesya Ibrahim OT    Future Appointments   Date Time Provider Gaye Lagunas   2/14/2020  4:30 PM SFE DEXA BI GE LUNAR DEXA SFERMAM SFE   2/18/2020  2:00 PM Yobany Sarah, OT [facility] SFE   2/21/2020  2:00 PM Yobany Sarah, OT [facility] SFE   2/24/2020  2:00 PM Yobany Sarah, OT [facility] SFE   2/25/2020  2:00 PM Yobany Sarah, OT [facility] SFE   2/27/2020  2:30 PM Yobany Sarah, OT [facility] SFE

## 2020-02-14 ENCOUNTER — HOSPITAL ENCOUNTER (OUTPATIENT)
Dept: MAMMOGRAPHY | Age: 65
Discharge: HOME | End: 2020-02-14
Attending: INTERNAL MEDICINE
Payer: COMMERCIAL

## 2020-02-14 DIAGNOSIS — Z78.0 MENOPAUSE: ICD-10-CM

## 2020-02-14 PROCEDURE — 77080 DXA BONE DENSITY AXIAL: CPT

## 2020-02-18 ENCOUNTER — HOSPITAL ENCOUNTER (OUTPATIENT)
Dept: PHYSICAL THERAPY | Age: 65
Discharge: HOME | End: 2020-02-18
Payer: COMMERCIAL

## 2020-02-18 PROCEDURE — 97140 MANUAL THERAPY 1/> REGIONS: CPT

## 2020-02-18 PROCEDURE — 97018 PARAFFIN BATH THERAPY: CPT

## 2020-02-18 PROCEDURE — 97110 THERAPEUTIC EXERCISES: CPT

## 2020-02-18 NOTE — PROGRESS NOTES
Jody More  : 1955  Primary: [address] at [place] [address] [facility], [address] [place] [address],[address], [place] [address].  [phone]   [phone]       OUTPATIENT OCCUPATIONAL THERAPY: Daily Treatment Note 2020  Visit Count:  14    ICD-10: Treatment Diagnosis: Stiffness of right wrist, not elsewhere classified (M25.631)Pain in right wrist (M25.531)Pain in right hand (M79.641)Stiffness of right hand, not elsewhere classified (M25.641)  Precautions/Allergies:   Depo-medrol [methylprednisolone acetate]   TREATMENT PLAN:  Effective Dates: 2020 TO 2020 (90 days). Frequency/Duration: 2-3 times a week for 90 Day(s)  Pre-treatment Symptoms/Complaints:My shoulder is hurting more then my wrist.  Pain: Initial: Pain Intensity 1: 3  Pain Location 1: Wrist, Hand  Pain Orientation 1: Right  Post Session:  3/10   Medications Last Reviewed:  2020   Updated Objective Findings:  Measurements taken for MD progress note.    TREATMENT:       Manual Therapy: (Soft Tissue Mobilization Duration  Duration: 15 Minutes  Duration: 15 Minutes): Technique: Retrograde massage, Connective tissue  Tissue Mobilized: Scar/adhesion(followed by PROM)  RUE Soft Tissue Mobilization: Yes  Technique: Retrograde massage, Connective tissue(followed by PROM)  Tissue Mobilized: Scar/adhesion   Therapeutic Exercise:                                                                               : The patient was instructed in a home exercise program.                                                Date:  2/10/20 Date:  20 Date:  20 Date:  20 Date:  20   Activity/Exercise Parameters Parameters Parameters Parameters Parameters   AROM during Fluidotherapy 20 min 20 min 20 min 20 min 20 min   Paraffin with Stretch 15 min  sup   15 min  sup 15 min  sup 15 min  sup 15 min   Retrograde massage, Friction Scar massage, Joint Mobilization 15 min   15 min 15 min 15 min 15 min   Scarf Curl 3 min   5 min 5 min 5 min 5 min   Washer Game 3 min 3  min 3 min 3 min 3 min   Individual Gripper          Hand [place] 20 reps   20 reps 20 reps 20 reps 20 reps   Cones          Sup/pro roll   5 min 5 min 5 min 5 min 5 min   Clothes Pins   20 reps 20 reps 20 reps 20 reps 20 reps   A-R Bar          Exerstick 5 min   5 min 5 min 5 min 5 min   Velcro-Roll          A&PROM Ex 3 min 5 min 5 min 3 min 3 min   RESISTIVE EXERCISES Weight/ Sets/Reps   Weight/ Sets/Reps Weight/ Sets/Reps Weight/ Sets/Reps Weight/ Sets/Reps   WEIGHT WELL        Sup/Pro        UD/RD        Wrist Flex/Ext        Free Weights          UBE(Minutes)          Nautilus        Compound Row        Vertical Chest        Overhead Press                    HEP: As above; handouts given to patient for all exercises. Therapeutic Modalities:      Right Wrist Heat  Type: Paraffin bath(with a supination stretch)  Duration : 15 minutes  Patient Position: Sitting                                        Joint Mobilization:        Treatment Times:  · Therapeutic Exercise: 30 minutes  · Manual Therapy: 15 minutes  · Parafin:15  minutes  · Whirlpool: minutes  · Other:  minutes    Treatment/Session Summary:    · Response to Treatment:  Patients tolerated treatment well with no complications. Upon completion of treatment, skin condition was normal.  · Communication/Consultation:  None today  · Equipment provided today:  None today  · Recommendations/Intent for next treatment session: Next visit will focus on increasing motion and decreasing pain. .    Total Treatment Billable Duration:  60 minutes  OT Patient Time In/Time Out  Time In: 0200  Time Out: 0300  Diamond Oviedo OT    Future Appointments   Date Time Provider Gaye Lagunas   2/21/2020  2:00 PM LARA Diaz E   2/24/2020  2:00 PM Benita Reed OT IRENE[facility]PDEBI SFE   2/25/2020  2:00 PM Benita Reed OT [facility] SFE   2/27/2020  2:30 PM Benita Reed OT JESSICA RHODESE

## 2020-02-21 ENCOUNTER — HOSPITAL ENCOUNTER (OUTPATIENT)
Dept: PHYSICAL THERAPY | Age: 65
Discharge: HOME | End: 2020-02-21
Payer: COMMERCIAL

## 2020-02-21 PROCEDURE — 97110 THERAPEUTIC EXERCISES: CPT

## 2020-02-21 PROCEDURE — 97140 MANUAL THERAPY 1/> REGIONS: CPT

## 2020-02-21 PROCEDURE — 97022 WHIRLPOOL THERAPY: CPT

## 2020-02-21 NOTE — PROGRESS NOTES
Geraldine Record  : 1955  Primary: [address] at [place]  [address] [facility] [address], [address], [address] [facility]  [phone]   [phone]       OUTPATIENT OCCUPATIONAL THERAPY: Daily Treatment Note 2020  Visit Count:  15    ICD-10: Treatment Diagnosis: Stiffness of right wrist, not elsewhere classified (M25.631)Pain in right wrist (M25.531)Pain in right hand (M79.641)Stiffness of right hand, not elsewhere classified (M25.641)  Precautions/Allergies:   Depo-medrol [methylprednisolone acetate]   TREATMENT PLAN:  Effective Dates: 2020 TO 2020 (90 days). Frequency/Duration: 2-3 times a week for 90 Day(s)  Pre-treatment Symptoms/Complaints:I will be getting an MRI on my shoulder Monday.   Pain: Initial: Pain Intensity 1: 3  Pain Location 1: Hand, Wrist  Pain Orientation 1: Right  Post Session:  3/10   Medications Last Reviewed:  2020   Updated Objective Findings:  None Today   TREATMENT:       Manual Therapy: (Soft Tissue Mobilization Duration  Duration: 15 Minutes  Duration: 15 Minutes): Technique: Connective tissue, Retrograde massage  Tissue Mobilized: Connective tissue  RUE Soft Tissue Mobilization: Yes  Technique: Retrograde massage, Connective tissue(followed by PROM)  Tissue Mobilized: Scar/adhesion   Therapeutic Exercise:                                                                               : The patient was instructed in a home exercise program.                                                Date:  2/10/20 Date:  20 Date:  20 Date:  20 Date:  20   Activity/Exercise Parameters Parameters Parameters Parameters Parameters   AROM during Fluidotherapy 20 min 20 min 20 min 20 min 20 min   Paraffin with Stretch 15 min  sup   15 min  sup 15 min  sup 15 min  sup    Retrograde massage, Friction Scar massage, Joint Mobilization 15 min   15 min 15 min 15 min 15 min   Scarf Curl 3 min   5 min 5 min 5 min 5 min   Washer Game 3 min 3  min 3 min 3 min 3 min   Individual Gripper          Hand [place] 20 reps   20 reps 20 reps 20 reps 20 reps   Cones          Sup/pro roll   5 min 5 min 5 min 5 min 5 min   Clothes Pins   20 reps 20 reps 20 reps 20 reps 20 reps   A-R Bar          Exerstick 5 min   5 min 5 min 5 min 5 min   Velcro-Roll          A&PROM Ex 3 min 5 min 5 min 3 min 3 min   RESISTIVE EXERCISES Weight/ Sets/Reps   Weight/ Sets/Reps Weight/ Sets/Reps Weight/ Sets/Reps Weight/ Sets/Reps   WEIGHT WELL        Sup/Pro        UD/RD        Wrist Flex/Ext        Free Weights          UBE(Minutes)          Nautilus        Compound Row        Vertical Chest        Overhead Press                    HEP: As above; handouts given to patient for all exercises. Therapeutic Modalities:      Right Wrist Heat  Type: Fluidotherapy(while perfoming AROM ex)  Duration : 20 minutes  Patient Position: Sitting                                        Joint Mobilization:        Treatment Times:  · Therapeutic Exercise: 30 minutes  · Manual Therapy: 15 minutes  · Parafin:  minutes  · Whirlpool:15 minutes  · Other:  minutes    Treatment/Session Summary:    · Response to Treatment:  Patients tolerated treatment well with no complications. Upon completion of treatment, skin condition was normal.  · Communication/Consultation:  None today  · Equipment provided today:  None today  · Recommendations/Intent for next treatment session: Next visit will focus on increasing motion and decreasing pain. .    Total Treatment Billable Duration:  60 minutes  OT Patient Time In/Time Out  Time In: 0200  Time Out: 0300  Leighton Barnes OT    Future Appointments   Date Time Provider Gaye Lagunas   2/24/2020  2:00 PM Karolina Antony OT [facility] IRENE   2/25/2020  2:00 PM LARA Manzano   2/27/2020  2:30 PM LARA Manzano

## 2020-02-24 ENCOUNTER — HOSPITAL ENCOUNTER (OUTPATIENT)
Dept: PHYSICAL THERAPY | Age: 65
Discharge: HOME | End: 2020-02-24
Payer: COMMERCIAL

## 2020-02-24 PROCEDURE — 97110 THERAPEUTIC EXERCISES: CPT

## 2020-02-24 PROCEDURE — 97140 MANUAL THERAPY 1/> REGIONS: CPT

## 2020-02-24 PROCEDURE — 97022 WHIRLPOOL THERAPY: CPT

## 2020-02-24 NOTE — PROGRESS NOTES
Eun Lopez  : 1955  Primary: [address] at [place] [address] [place] [address],[address], [place] [address].  [phone]   [phone]       OUTPATIENT OCCUPATIONAL THERAPY: Daily Treatment Note 2020  Visit Count:  16    ICD-10: Treatment Diagnosis: Stiffness of right wrist, not elsewhere classified (M25.631)Pain in right wrist (M25.531)Pain in right hand (M79.641)Stiffness of right hand, not elsewhere classified (M25.641)  Precautions/Allergies:   Depo-medrol [methylprednisolone acetate]   TREATMENT PLAN:  Effective Dates: 2020 TO 2020 (90 days). Frequency/Duration: 2-3 times a week for 90 Day(s)  Pre-treatment Symptoms/Complaints:I am moving a little bit better. .  Pain: Initial: Pain Intensity 1: 3  Pain Location 1: Hand, Wrist  Pain Orientation 1: Right  Post Session:  3/10   Medications Last Reviewed:  2020   Updated Objective Findings:  None Today   TREATMENT:       Manual Therapy: (Soft Tissue Mobilization Duration  Duration: 15 Minutes  Duration: 15 Minutes): Technique: Connective tissue, Retrograde massage  Tissue Mobilized: Connective tissue  RUE Soft Tissue Mobilization: Yes  Technique: Retrograde massage, Connective tissue(followed by PROM)  Tissue Mobilized: Scar/adhesion   Therapeutic Exercise:                                                                               : The patient was instructed in a home exercise program.                                                Date:  20 Date:  20 Date:  20 Date:  20 Date:  20   Activity/Exercise Parameters Parameters Parameters Parameters Parameters   AROM during Fluidotherapy 20 min 20 min 20 min 20 min 20 min   Paraffin with Stretch    15 min  sup 15 min  sup 15 min  sup    Retrograde massage, Friction Scar massage, Joint Mobilization 15 min   15 min 15 min 15 min 15 min   Scarf Curl 3 min   5 min 5 min 5 min 5 min Washer Game 3 min 3  min 3 min 3 min 3 min   Individual Gripper          Hand [place] 20 reps   20 reps 20 reps 20 reps 20 reps   Cones          Sup/pro roll   5 min 5 min 5 min 5 min 5 min   Clothes Pins   20 reps 20 reps 20 reps 20 reps 20 reps   A-R Bar          Exerstick 5 min   5 min 5 min 5 min 5 min   Velcro-Roll          A&PROM Ex 3 min 5 min 5 min 3 min 3 min   RESISTIVE EXERCISES Weight/ Sets/Reps   Weight/ Sets/Reps Weight/ Sets/Reps Weight/ Sets/Reps Weight/ Sets/Reps   WEIGHT WELL        Sup/Pro        UD/RD        Wrist Flex/Ext        Free Weights          UBE(Minutes)          Nautilus        Compound Row        Vertical Chest        Overhead Press                    HEP: As above; handouts given to patient for all exercises. Therapeutic Modalities:      Right Wrist Heat  Type: Fluidotherapy(while perfoming AROM ex)  Duration : 20 minutes  Patient Position: Sitting                                        Joint Mobilization:        Treatment Times:  · Therapeutic Exercise: 30 minutes  · Manual Therapy: 15 minutes  · Parafin:  minutes  · Whirlpool:15 minutes  · Other:  minutes    Treatment/Session Summary:    · Response to Treatment:  Patients tolerated treatment well with no complications. Upon completion of treatment, skin condition was normal.  · Communication/Consultation:  None today  · Equipment provided today:  None today  · Recommendations/Intent for next treatment session: Next visit will focus on increasing motion and decreasing pain. .    Total Treatment Billable Duration:  60 minutes  OT Patient Time In/Time Out  Time In: 0200  Time Out: 0300  Amparo Crawford OT    Future Appointments   Date Time Provider Gaye Lagunas   2/25/2020  2:00 PM Joel Hood OT [facility] IRENE   2/27/2020  2:30 PM LARA Sr EMIL

## 2020-02-25 ENCOUNTER — HOSPITAL ENCOUNTER (OUTPATIENT)
Dept: PHYSICAL THERAPY | Age: 65
Discharge: HOME | End: 2020-02-25
Payer: COMMERCIAL

## 2020-02-25 PROCEDURE — 97140 MANUAL THERAPY 1/> REGIONS: CPT

## 2020-02-25 PROCEDURE — 97022 WHIRLPOOL THERAPY: CPT

## 2020-02-25 PROCEDURE — 97110 THERAPEUTIC EXERCISES: CPT

## 2020-02-26 PROBLEM — M19.011 DEGENERATIVE JOINT DISEASE OF RIGHT ACROMIOCLAVICULAR JOINT: Status: ACTIVE | Noted: 2020-02-26

## 2020-02-26 PROBLEM — M75.01 ADHESIVE CAPSULITIS OF RIGHT SHOULDER: Status: ACTIVE | Noted: 2020-02-26

## 2020-02-26 PROBLEM — S46.111A STRAIN OF MUSCLE, FASCIA AND TENDON OF LONG HEAD OF BICEPS, RIGHT ARM, INITIAL ENCOUNTER: Status: ACTIVE | Noted: 2020-02-26

## 2020-02-26 PROBLEM — S46.011A TRAUMATIC TEAR OF RIGHT ROTATOR CUFF: Status: ACTIVE | Noted: 2020-02-26

## 2020-02-26 PROBLEM — S43.431A SUPERIOR GLENOID LABRUM LESION OF RIGHT SHOULDER: Status: ACTIVE | Noted: 2020-02-26

## 2020-02-26 NOTE — PROGRESS NOTES
Fanny Davila  : 1955  Primary: [address] at [place] [id number] [place] [address], [place] [address].  [phone]   [phone]       OUTPATIENT OCCUPATIONAL THERAPY: Daily Treatment Note 2020  Visit Count:  17    ICD-10: Treatment Diagnosis: Stiffness of right wrist, not elsewhere classified (M25.631)Pain in right wrist (M25.531)Pain in right hand (M79.641)Stiffness of right hand, not elsewhere classified (M25.641)  Precautions/Allergies:   Depo-medrol [methylprednisolone acetate]   TREATMENT PLAN:  Effective Dates: 2020 TO 2020 (90 days). Frequency/Duration: 2-3 times a week for 90 Day(s)  Pre-treatment Symptoms/Complaints:I am still having swelling if I do much. .  Pain: Initial: Pain Intensity 1: 3  Pain Location 1: Hand, Wrist  Pain Orientation 1: Right  Post Session:  3/10   Medications Last Reviewed:  2020  Updated Objective Findings:  None Today   TREATMENT:       Manual Therapy: (Soft Tissue Mobilization Duration  Duration: 15 Minutes  Duration: 15 Minutes): Technique: Connective tissue, Retrograde massage  Tissue Mobilized: Connective tissue  RUE Soft Tissue Mobilization: Yes  Technique: Retrograde massage, Connective tissue(followed by PROM)  Tissue Mobilized: Scar/adhesion   Therapeutic Exercise:                                                                               : The patient was instructed in a home exercise program.                                                Date:  20 Date:  20 Date:  20 Date:  20 Date:  20   Activity/Exercise Parameters Parameters Parameters Parameters Parameters   AROM during Fluidotherapy 20 min 20 min 20 min 20 min 20 min   Paraffin with Stretch     15 min  sup 15 min  sup    Retrograde massage, Friction Scar massage, Joint Mobilization 15 min   15 min 15 min 15 min 15 min   Scarf Curl 3 min   5 min 5 min 5 min 5 min   Washer Game 3 min 3  min 3 min 3 min 3 min   Individual Gripper          Hand [place] 20 reps   20 reps 20 reps 20 reps 20 reps   Cones          Sup/pro roll   5 min 5 min 5 min 5 min 5 min   Clothes Pins   20 reps 20 reps 20 reps 20 reps 20 reps   A-R Bar          Exerstick 5 min   5 min 5 min 5 min 5 min   Velcro-Roll          A&PROM Ex 3 min 5 min 5 min 3 min 3 min   RESISTIVE EXERCISES Weight/ Sets/Reps   Weight/ Sets/Reps Weight/ Sets/Reps Weight/ Sets/Reps Weight/ Sets/Reps   WEIGHT WELL        Sup/Pro        UD/RD        Wrist Flex/Ext        Free Weights    1 lb.  wrist      UBE(Minutes)          Nautilus        Compound Row        Vertical Chest        Overhead Press                    HEP: As above; handouts given to patient for all exercises. Therapeutic Modalities:      Right Wrist Heat  Type: Fluidotherapy(while perfoming AROM ex)  Duration : 20 minutes  Patient Position: Sitting                                        Joint Mobilization:        Treatment Times:  · Therapeutic Exercise: 30 minutes  · Manual Therapy: 15 minutes  · Parafin:  minutes  · Whirlpool:15 minutes  · Other:  minutes    Treatment/Session Summary:    · Response to Treatment:  Patients tolerated treatment well with no complications. Upon completion of treatment, skin condition was normal.  · Communication/Consultation:  None today  · Equipment provided today:  None today  · Recommendations/Intent for next treatment session: Next visit will focus on increasing motion and decreasing pain. .    Total Treatment Billable Duration:  60 minutes  OT Patient Time In/Time Out  Time In: 0200  Time Out: 0300  [facility]    Future Appointments   Date Time Provider Gaye Lagunas   2/27/2020  2:30 PM LARA Burrell   2/28/2020  8:30 AM SFE PHONE APPOINTMENT GEORGE BONILLA   3/2/2020  2:00 PM LARA BurrellORPDEBI SFE   3/5/2020  2:30 PM LARA BurrellE   3/10/2020  2:00 PM LARA BurrellE 3/12/2020  3:00 PM Fadi Akers OT SFEORPT SFE

## 2020-02-26 NOTE — H&P
[facility] HISTORY AND PHYSICAL    Subjective:     Patient is a 62 y.o. RHD FEMALE WITH RIGHT SHOULDER PAIN. SEE OFFICE NOTE. Patient Active Problem List    Diagnosis Date Noted    Traumatic tear of right rotator cuff 02/26/2020    Strain of muscle, fascia and tendon of long head of biceps, right arm, initial encounter 02/26/2020    Superior glenoid labrum lesion of right shoulder 02/26/2020    Degenerative joint disease of right acromioclavicular joint 02/26/2020    Adhesive capsulitis of right shoulder 02/26/2020    Vitamin D deficiency 01/10/2020    Closed fracture of right wrist 01/10/2020    Hypothyroidism 03/19/2014    Esophageal reflux 03/19/2014    Hyperlipidemia 03/19/2014     Past Medical History:   Diagnosis Date    GERD (gastroesophageal reflux disease)     no current complicatons-managed well with diet    Hypercholesterolemia     managed well with Lipitor    Hypothyroidism 03/19/2014    managed well with Synthroid    Right wrist pain       Past Surgical History:   Procedure Laterality Date    HX WISDOM TEETH EXTRACTION  1983    HX WRIST FRACTURE [address] Right 12/31/2019    POA      Prior to Admission medications    Medication Sig Start Date End Date Taking? Authorizing Provider   calcium-vitamin D (OSCAL 250) 250-125 mg-unit tablet Take 1 Tab by mouth three (3) times daily. Provider, Historical   levothyroxine (SYNTHROID) 75 mcg tablet TAKE ONE TABLET BY MOUTH EVERY DAY 1/10/20   William Card MD   acetaminophen (TYLENOL EXTRA STRENGTH) 500 mg tablet Take 500 mg by mouth every six (6) hours as needed for Pain. Provider, Historical   multivitamin with minerals (HAIR,SKIN AND NAILS PO) Take 1 Tab by mouth daily. Provider, Historical   ergocalciferol (ERGOCALCIFEROL) 50,000 unit capsule Take 1 Cap by mouth every seven (7) days. Patient taking differently: Take 50,000 Units by mouth every seven (7) days.  Every Saturday 12/5/19   William Card MD Allergies   Allergen Reactions    Depo-Medrol [Methylprednisolone Acetate] Unable to Obtain      Social History     Tobacco Use    Smoking status: Never Smoker    Smokeless tobacco: Never Used   Substance Use Topics    Alcohol use: No      No family history on file. Review of Systems  A comprehensive review of systems was negative except for that written in the HPI. Objective:     No data found. There were no vitals taken for this visit. General:  Alert, cooperative, no distress, appears stated age. Head:  Normocephalic, without obvious abnormality, atraumatic. Back:   Symmetric, no curvature. ROM normal. No CVA tenderness. Lungs:   Clear to auscultation bilaterally. Chest wall:  No tenderness or deformity. Heart:  Regular rate and rhythm, S1, S2 normal, no murmur, click, rub or gallop. Extremities: Extremities normal, atraumatic, no cyanosis or edema. Pulses: 2+ and symmetric all extremities. Skin: Skin color, texture, turgor normal. No rashes or lesions. Lymph nodes: Cervical, supraclavicular, and axillary nodes normal.   Neurologic: CNII-XII intact. Normal strength, sensation and reflexes throughout. Assessment:     Principal Problem:    Traumatic tear of right rotator cuff (2/26/2020)    Active Problems:    Strain of muscle, fascia and tendon of long head of biceps, right arm, initial encounter (2/26/2020)      Superior glenoid labrum lesion of right shoulder (2/26/2020)      Degenerative joint disease of right acromioclavicular joint (2/26/2020)      Adhesive capsulitis of right shoulder (2/26/2020)        Plan:     The various methods of treatment have been discussed with the patient and family. PATIENT HAS EXHAUSTED NON-OPERATIVE MODALITIES     After consideration of risks, benefits and other options for treatment, the patient has consented to surgical intervention.     SEE OFFICE NOTE    Lili Hansen MD

## 2020-02-26 NOTE — BRIEF OP NOTE
BRIEF OPERATIVE NOTE    Date of Procedure: 3/6/2020     Preoperative Diagnosis:  PARTIAL THICKNESS ROTATOR CUFF TEAR RIGHT SHOULDER      BICEPITAL STRAIN RIGHT SHOULDER      SLAP TEAR RIGHT SHOULDER      ADHESIVE CAPSULITIS RIGHT SHOULDER      AC OA RIGHT SHOULDER    Postoperative Diagnosis:  SAME    Procedure(s): EXAMINATION AND MANIPULATION RIGHT SHOULDER ARTHROSCOPY RIGHT SHOULDER ARTHROSCOPIC SUBACROMIAL DECOMPRESSION, DISTAL CLAVICLE RESECTION, LYSIS OF ADHESIONS, EXTENSIVE DEBRIDEMENT SLAP TEAR, GLENOHUMERAL JOINT, SUBACROMIAL SPACE, MINI OPEN ROTATOR CUFF REPAIR, BICEPS TENODESIS    Surgeon(s) and Role:     * Licha Prince MD - Primary         Assistant Staff:  Cristiana Kim NP      Surgical Staff:  Circ-1: (Unknown)  Scrub Tech-1: (Unknown)  Scrub Tech-2: (Unknown)  Scrub Tech-3: (Unknown)  No case tracking events are documented in the log. Anesthesia:  GENERAL WITH INTERSCALENE BLOCK    Estimated Blood Loss: 20 cc. Complications: NONE    Implants:   Implant Name Type Inv.  Item Serial No.  Lot No. LRB No. Used Action   ANCHOR SUT 5.5MM W/NDL PEEK ZP --  - [id number]  ANCHOR SUT 5.5MM W/NDL PEEK ZP --  [id number] [employer] [id number] Right 2 Implanted   ANCHOR SUT 2.3MM W/2.0MM BRAID - [id number]  ANCHOR SUT 2.3MM W/2.0MM BRAID [id number] [employer] [id number] Right 1 Implanted       Vijay Ugalde MD

## 2020-02-27 ENCOUNTER — HOSPITAL ENCOUNTER (OUTPATIENT)
Dept: PHYSICAL THERAPY | Age: 65
Discharge: HOME | End: 2020-02-27
Payer: COMMERCIAL

## 2020-02-27 PROCEDURE — 97110 THERAPEUTIC EXERCISES: CPT

## 2020-02-27 PROCEDURE — 97022 WHIRLPOOL THERAPY: CPT

## 2020-02-27 PROCEDURE — 97140 MANUAL THERAPY 1/> REGIONS: CPT

## 2020-02-28 ENCOUNTER — HOSPITAL ENCOUNTER (OUTPATIENT)
Dept: SURGERY | Age: 65
Discharge: HOME | End: 2020-02-28

## 2020-02-28 VITALS — BODY MASS INDEX: 27.82 KG/M2 | HEIGHT: 59 IN | WEIGHT: 138 LBS

## 2020-02-28 RX ORDER — CHROMIUM PICOLINATE 200 MCG
TABLET ORAL 2 TIMES DAILY
COMMUNITY

## 2020-02-28 RX ORDER — ATORVASTATIN CALCIUM 20 MG/1
20 TABLET, FILM COATED ORAL
COMMUNITY

## 2020-02-28 RX ORDER — VITAMIN E 1000 UNIT
1000 CAPSULE ORAL DAILY
COMMUNITY

## 2020-02-28 RX ORDER — OMEPRAZOLE 20 MG/1
20 CAPSULE, DELAYED RELEASE ORAL
COMMUNITY
End: 2020-06-17 | Stop reason: CLARIF

## 2020-02-28 NOTE — PERIOP NOTES
Patient verified name and . Order for consent  YES found in EHR and matches case posting; patient verifies procedure. Type 1B surgery, phone assessment complete. Orders received. Labs per surgeon: none  Labs per anesthesia protocol: none      Patient answered medical/surgical history questions at their best of ability. All prior to admission medications documented in [facility]. Patient instructed to take the following medications the day of surgery according to anesthesia guidelines with a small sip of water: Levothyroxine, Omeprazole . Hold all vitamins 7 days prior to surgery and NSAIDS 5 days prior to surgery. Prescription meds to hold:none    Patient instructed on the following:  Arrive at A Entrance, time of arrival to be called the day before by 1700  NPO after midnight including gum, mints, and ice chips  Responsible adult must drive patient to the hospital, stay during surgery, and patient will need supervision 24 hours after anesthesia  Use antibacterial soap in shower the night before surgery and on the morning of surgery  All piercings must be removed prior to arrival.    Leave all valuables (money and jewelry) at home but bring insurance card and ID on       DOS. Do not wear make-up, nail polish, lotions, cologne, perfumes, powders, or oil on skin. Patient teach back successful and patient demonstrates knowledge of instruction.

## 2020-03-02 ENCOUNTER — HOSPITAL ENCOUNTER (OUTPATIENT)
Dept: PHYSICAL THERAPY | Age: 65
Discharge: HOME | End: 2020-03-02
Payer: COMMERCIAL

## 2020-03-02 PROCEDURE — 97140 MANUAL THERAPY 1/> REGIONS: CPT

## 2020-03-02 PROCEDURE — 97110 THERAPEUTIC EXERCISES: CPT

## 2020-03-02 PROCEDURE — 97022 WHIRLPOOL THERAPY: CPT

## 2020-03-02 NOTE — PROGRESS NOTES
Mayur Whitfield  : 1955  Primary: Mary 83 at [facility]  [address] [facility] [address], [address] [facility]  [phone]   [phone]       OUTPATIENT OCCUPATIONAL THERAPY: Daily Treatment Note 3/2/2020  Visit Count:  19    ICD-10: Treatment Diagnosis: Stiffness of right wrist, not elsewhere classified (M25.631)Pain in right wrist (M25.531)Pain in right hand (M79.641)Stiffness of right hand, not elsewhere classified (M25.641)  Precautions/Allergies:   Depo-medrol [methylprednisolone acetate]   TREATMENT PLAN:  Effective Dates: 2020 TO 2020 (90 days). Frequency/Duration: 2-3 times a week for 90 Day(s)  Pre-treatment Symptoms/Complaints:My shoulder pain is really increasing.   Pain: Initial: Pain Intensity 1: 3  Pain Location 1: Hand, Wrist  Pain Orientation 1: Right  Post Session:  3/10   Medications Last Reviewed:  3/2/2020  Updated Objective Findings:  None Today   TREATMENT:       Manual Therapy: (Soft Tissue Mobilization Duration  Duration: 15 Minutes  Duration: 15 Minutes): Technique: Connective tissue, Retrograde massage  Tissue Mobilized: Connective tissue  RUE Soft Tissue Mobilization: Yes  Technique: Retrograde massage, Connective tissue(followed by PROM ex)  Tissue Mobilized: Scar/adhesion   Therapeutic Exercise:                                                                               : The patient was instructed in a home exercise program.                                                Date:  20 Date:  20 Date:  20 Date:  3/2/20 Date:  20   Activity/Exercise Parameters Parameters Parameters Parameters Parameters   AROM during Fluidotherapy 20 min 20 min 20 min 20 min 20 min   Paraffin with Stretch          Retrograde massage, Friction Scar massage, Joint Mobilization 15 min   15 min 15 min 15 min 15 min   Scarf Curl 3 min   5 min 5 min 5 min 5 min   Washer Game 3 min 3  min 3 min 3 min 3 min   Individual Gripper          Hand [place] 20 reps   20 reps 20 reps 20 reps 20 reps   Cones          Sup/pro roll   5 min 5 min 5 min 5 min 5 min   Clothes Pins   20 reps 20 reps 20 reps 20 reps 20 reps   A-R Bar          Exerstick 5 min   5 min 5 min 5 min 5 min   Velcro-Roll          A&PROM Ex 3 min 5 min 5 min 3 min 3 min   RESISTIVE EXERCISES Weight/ Sets/Reps   Weight/ Sets/Reps Weight/ Sets/Reps Weight/ Sets/Reps Weight/ Sets/Reps   WEIGHT WELL        Sup/Pro        UD/RD        Wrist Flex/Ext        Free Weights    1 lb.  wrist 1 lb  wrist 1 lb  wrist    UBE(Minutes)          Nautilus        Compound Row        Vertical Chest        Overhead Press                    HEP: As above; handouts given to patient for all exercises. Therapeutic Modalities:      Right Wrist Heat  Type: Fluidotherapy(while perfoming AROM ex)  Duration : 20 minutes  Patient Position: Sitting                                        Joint Mobilization:        Treatment Times:  · Therapeutic Exercise: 30 minutes  · Manual Therapy: 15 minutes  · Parafin:  minutes  · Whirlpool:15 minutes  · Other:  minutes    Treatment/Session Summary:    · Response to Treatment:  Patients tolerated treatment well with no complications. Upon completion of treatment, skin condition was normal.  · Communication/Consultation:  None today  · Equipment provided today:  None today  · Recommendations/Intent for next treatment session: Next visit will focus on increasing motion and decreasing pain. .    Total Treatment Billable Duration:  60 minutes  OT Patient Time In/Time Out  Time In: 0200  Time Out: 0300  John Jose OT    Future Appointments   Date Time Provider Gaye Lagunas   3/5/2020  2:30 PM LARA Aragon   3/10/2020  7:00 PM LARA Aragon

## 2020-03-02 NOTE — PROGRESS NOTES
Nelson All  : 1955  Primary: Mary 83 at [place] [address], [place] [address].  [phone]   [phone]       OUTPATIENT OCCUPATIONAL THERAPY: Daily Treatment Note 2020  Visit Count:  18    ICD-10: Treatment Diagnosis: Stiffness of right wrist, not elsewhere classified (M25.631)Pain in right wrist (M25.531)Pain in right hand (M79.641)Stiffness of right hand, not elsewhere classified (M25.641)  Precautions/Allergies:   Depo-medrol [methylprednisolone acetate]   TREATMENT PLAN:  Effective Dates: 2020 TO 2020 (90 days). Frequency/Duration: 2-3 times a week for 90 Day(s)  Pre-treatment Symptoms/Complaints:I am having surgery on my shoulder next Friday.   Pain: Initial: Pain Intensity 1: 3  Pain Location 1: Hand, Wrist  Pain Orientation 1: Right  Post Session:  3/10   Medications Last Reviewed:  2020  Updated Objective Findings:  None Today   TREATMENT:       Manual Therapy: (Soft Tissue Mobilization Duration  Duration: 15 Minutes  Duration: 15 Minutes): Technique: Connective tissue, Retrograde massage(followed by PROM)  Tissue Mobilized: Connective tissue  RUE Soft Tissue Mobilization: Yes  Technique: Retrograde massage, Connective tissue(followed by PROM)  Tissue Mobilized: Scar/adhesion   Therapeutic Exercise:                                                                               : The patient was instructed in a home exercise program.                                                Date:  20 Date:  20 Date:  20 Date:  20 Date:  20   Activity/Exercise Parameters Parameters Parameters Parameters Parameters   AROM during Fluidotherapy 20 min 20 min 20 min 20 min 20 min   Paraffin with Stretch      15 min  sup    Retrograde massage, Friction Scar massage, Joint Mobilization 15 min   15 min 15 min 15 min 15 min   Scarf Curl 3 min   5 min 5 min 5 min 5 min   Washer Game 3 min 3  min 3 min 3 min 3 min   Individual Gripper          Hand [place] 20 reps   20 reps 20 reps 20 reps 20 reps   Cones          Sup/pro roll   5 min 5 min 5 min 5 min 5 min   Clothes Pins   20 reps 20 reps 20 reps 20 reps 20 reps   A-R Bar          Exerstick 5 min   5 min 5 min 5 min 5 min   Velcro-Roll          A&PROM Ex 3 min 5 min 5 min 3 min 3 min   RESISTIVE EXERCISES Weight/ Sets/Reps   Weight/ Sets/Reps Weight/ Sets/Reps Weight/ Sets/Reps Weight/ Sets/Reps   WEIGHT WELL        Sup/Pro        UD/RD        Wrist Flex/Ext        Free Weights    1 lb.  wrist 1 lb  wrist     UBE(Minutes)          Nautilus        Compound Row        Vertical Chest        Overhead Press                    HEP: As above; handouts given to patient for all exercises. Therapeutic Modalities:      Right Wrist Heat  Type: Fluidotherapy(while performing AROM ex)  Duration : 20 minutes  Patient Position: Sitting                                        Joint Mobilization:        Treatment Times:  · Therapeutic Exercise: 30 minutes  · Manual Therapy: 15 minutes  · Parafin:  minutes  · Whirlpool:15 minutes  · Other:  minutes    Treatment/Session Summary:    · Response to Treatment:  Patients tolerated treatment well with no complications. Upon completion of treatment, skin condition was normal.  · Communication/Consultation:  None today  · Equipment provided today:  None today  · Recommendations/Intent for next treatment session: Next visit will focus on increasing motion and decreasing pain. .    Total Treatment Billable Duration:  60 minutes  OT Patient Time In/Time Out  Time In: 7355  Time Out: 5222  Kylee Chao OT    Future Appointments   Date Time Provider Gaey Lagunas   3/2/2020  2:00 PM Maryann Clayton OT [facility] IRENE   3/5/2020  2:30 PM LARA Castillo   3/10/2020  7:00 PM LARA Castillo

## 2020-03-05 ENCOUNTER — HOSPITAL ENCOUNTER (OUTPATIENT)
Dept: PHYSICAL THERAPY | Age: 65
Discharge: HOME | End: 2020-03-05
Payer: COMMERCIAL

## 2020-03-05 PROCEDURE — 97140 MANUAL THERAPY 1/> REGIONS: CPT

## 2020-03-05 PROCEDURE — 97022 WHIRLPOOL THERAPY: CPT

## 2020-03-05 PROCEDURE — 97110 THERAPEUTIC EXERCISES: CPT

## 2020-03-05 NOTE — PROGRESS NOTES
Sola Hines  : 1955  Primary: [address] 83 at [place]  [address], [address] [facility]  [phone]   [phone]       OUTPATIENT OCCUPATIONAL THERAPY: Daily Treatment Note 3/5/2020  Visit Count:  20    ICD-10: Treatment Diagnosis: Stiffness of right wrist, not elsewhere classified (M25.631)Pain in right wrist (M25.531)Pain in right hand (M79.641)Stiffness of right hand, not elsewhere classified (M25.641)  Precautions/Allergies:   Depo-medrol [methylprednisolone acetate]   TREATMENT PLAN:  Effective Dates: 2020 TO 2020 (90 days). Frequency/Duration: 2-3 times a week for 90 Day(s)  Pre-treatment Symptoms/Complaints:My shoulder surgery is tomorrow. .  Pain: Initial: Pain Intensity 1: 4  Pain Location 1: Hand, Wrist  Pain Orientation 1: Right  Post Session:  3/10   Medications Last Reviewed:  3/2/2020  Updated Objective Findings:  None Today   TREATMENT:       Manual Therapy: (Soft Tissue Mobilization Duration  Duration: 15 Minutes  Duration: 15 Minutes): Technique: Connective tissue, Retrograde massage  Tissue Mobilized: Connective tissue  RUE Soft Tissue Mobilization: Yes  Technique: Retrograde massage, Connective tissue(followed by PROM)  Tissue Mobilized: Scar/adhesion   Therapeutic Exercise:                                                                               : The patient was instructed in a home exercise program.                                                Date:  20 Date:  20 Date:  20 Date:  3/2/20 Date:  3/5/20   Activity/Exercise Parameters Parameters Parameters Parameters Parameters   AROM during Fluidotherapy 20 min 20 min 20 min 20 min 20 min   Paraffin with Stretch          Retrograde massage, Friction Scar massage, Joint Mobilization 15 min   15 min 15 min 15 min 15 min   Scarf Curl 3 min   5 min 5 min 5 min 5 min   Washer Game 3 min 3  min 3 min 3 min 3 min Individual Gripper          Hand [place] 20 reps   20 reps 20 reps 20 reps 20 reps   Cones          Sup/pro roll   5 min 5 min 5 min 5 min 5 min   Clothes Pins   20 reps 20 reps 20 reps 20 reps 20 reps   A-R Bar          Exerstick 5 min   5 min 5 min 5 min 5 min   Velcro-Roll          A&PROM Ex 3 min 5 min 5 min 3 min 3 min   RESISTIVE EXERCISES Weight/ Sets/Reps   Weight/ Sets/Reps Weight/ Sets/Reps Weight/ Sets/Reps Weight/ Sets/Reps   WEIGHT WELL        Sup/Pro        UD/RD        Wrist Flex/Ext        Free Weights    1 lb.  wrist 1 lb  wrist 1 lb  wrist 1 lb.  wrist   UBE(Minutes)          Nautilus        Compound Row        Vertical Chest        Overhead Press                    HEP: As above; handouts given to patient for all exercises. Therapeutic Modalities:      Right Wrist Heat  Type: Fluidotherapy(while perfoming AROMex)  Duration : 20 minutes  Patient Position: Sitting                                        Joint Mobilization:        Treatment Times:  · Therapeutic Exercise: 30 minutes  · Manual Therapy: 15 minutes  · Parafin:  minutes  · Whirlpool:15 minutes  · Other:  minutes    Treatment/Session Summary:    · Response to Treatment:  Patients tolerated treatment well with no complications. Upon completion of treatment, skin condition was normal.  · Communication/Consultation:  None today  · Equipment provided today:  None today  · Recommendations/Intent for next treatment session: To discharge at this time. Will be having shoulder surgery in the morning.  .    Total Treatment Billable Duration:  60 minutes  OT Patient Time In/Time Out  Time In: 0230  Time Out: Kayla Peoples, OT    Future Appointments   Date Time Provider Gaye Lagunas   3/10/2020  7:00 PM LARA Clemente

## 2020-03-06 ENCOUNTER — APPOINTMENT (OUTPATIENT)
Dept: GENERAL RADIOLOGY | Age: 65
End: 2020-03-06
Attending: ORTHOPAEDIC SURGERY
Payer: COMMERCIAL

## 2020-03-06 ENCOUNTER — ANESTHESIA EVENT (OUTPATIENT)
Dept: SURGERY | Age: 65
End: 2020-03-06
Payer: COMMERCIAL

## 2020-03-06 ENCOUNTER — HOSPITAL ENCOUNTER (OUTPATIENT)
Age: 65
Setting detail: OUTPATIENT SURGERY
Discharge: HOME | End: 2020-03-06
Attending: ORTHOPAEDIC SURGERY | Admitting: ORTHOPAEDIC SURGERY
Payer: COMMERCIAL

## 2020-03-06 ENCOUNTER — ANESTHESIA (OUTPATIENT)
Dept: SURGERY | Age: 65
End: 2020-03-06
Payer: COMMERCIAL

## 2020-03-06 VITALS
HEIGHT: 59 IN | TEMPERATURE: 97.7 F | WEIGHT: 135.6 LBS | SYSTOLIC BLOOD PRESSURE: 136 MMHG | HEART RATE: 63 BPM | OXYGEN SATURATION: 95 % | DIASTOLIC BLOOD PRESSURE: 62 MMHG | BODY MASS INDEX: 27.34 KG/M2 | RESPIRATION RATE: 16 BRPM

## 2020-03-06 LAB
EST. AVERAGE GLUCOSE BLD GHB EST-MCNC: 117 MG/DL
GLUCOSE BLD STRIP.AUTO-MCNC: 108 MG/DL (ref 65–100)
HBA1C MFR BLD: 5.7 %

## 2020-03-06 PROCEDURE — 77030027384 HC PRB ARTHSCP SERFAS STRY -C: Performed by: ORTHOPAEDIC SURGERY

## 2020-03-06 PROCEDURE — 77030003602 HC NDL NRV BLK BBMI -B: Performed by: ANESTHESIOLOGY

## 2020-03-06 PROCEDURE — 74011250636 HC RX REV CODE- 250/636: Performed by: NURSE PRACTITIONER

## 2020-03-06 PROCEDURE — 77030040922 HC BLNKT HYPOTHRM STRY -A: Performed by: ANESTHESIOLOGY

## 2020-03-06 PROCEDURE — 82962 GLUCOSE BLOOD TEST: CPT

## 2020-03-06 PROCEDURE — 74011250636 HC RX REV CODE- 250/636: Performed by: NURSE ANESTHETIST, CERTIFIED REGISTERED

## 2020-03-06 PROCEDURE — 77030003666 HC NDL SPINAL BD -A: Performed by: ORTHOPAEDIC SURGERY

## 2020-03-06 PROCEDURE — 83036 HEMOGLOBIN GLYCOSYLATED A1C: CPT

## 2020-03-06 PROCEDURE — 77030006668 HC BLD SHV MENSCS STRY -B: Performed by: ORTHOPAEDIC SURGERY

## 2020-03-06 PROCEDURE — 77030026438 HC STYL ET INTUB CARD -A: Performed by: ANESTHESIOLOGY

## 2020-03-06 PROCEDURE — 77030002986 HC SUT PROL J&J -A: Performed by: ORTHOPAEDIC SURGERY

## 2020-03-06 PROCEDURE — C1713 ANCHOR/SCREW BN/BN,TIS/BN: HCPCS | Performed by: ORTHOPAEDIC SURGERY

## 2020-03-06 PROCEDURE — 77030037088 HC TUBE ENDOTRACH ORAL NSL COVD-A: Performed by: ANESTHESIOLOGY

## 2020-03-06 PROCEDURE — 77030004453 HC BUR SHV STRY -B: Performed by: ORTHOPAEDIC SURGERY

## 2020-03-06 PROCEDURE — 77030039425 HC BLD LARYNG TRULITE DISP TELE -A: Performed by: ANESTHESIOLOGY

## 2020-03-06 PROCEDURE — 77030031139 HC SUT VCRL2 J&J -A: Performed by: ORTHOPAEDIC SURGERY

## 2020-03-06 PROCEDURE — 77030033005 HC TBNG ARTHSC PMP STRY -B: Performed by: ORTHOPAEDIC SURGERY

## 2020-03-06 PROCEDURE — 77030006891 HC BLD SHV RESECT STRY -B: Performed by: ORTHOPAEDIC SURGERY

## 2020-03-06 PROCEDURE — 73030 X-RAY EXAM OF SHOULDER: CPT

## 2020-03-06 PROCEDURE — 76210000016 HC OR PH I REC 1 TO 1.5 HR: Performed by: ORTHOPAEDIC SURGERY

## 2020-03-06 PROCEDURE — 74011000250 HC RX REV CODE- 250: Performed by: NURSE ANESTHETIST, CERTIFIED REGISTERED

## 2020-03-06 PROCEDURE — 77030018986 HC SUT ETHBND4 J&J -B: Performed by: ORTHOPAEDIC SURGERY

## 2020-03-06 PROCEDURE — 77030002916 HC SUT ETHLN J&J -A: Performed by: ORTHOPAEDIC SURGERY

## 2020-03-06 PROCEDURE — 76210000020 HC REC RM PH II FIRST 0.5 HR: Performed by: ORTHOPAEDIC SURGERY

## 2020-03-06 PROCEDURE — 74011250636 HC RX REV CODE- 250/636: Performed by: ANESTHESIOLOGY

## 2020-03-06 PROCEDURE — 76060000033 HC ANESTHESIA 1 TO 1.5 HR: Performed by: ORTHOPAEDIC SURGERY

## 2020-03-06 PROCEDURE — 76010000161 HC OR TIME 1 TO 1.5 HR INTENSV-TIER 1: Performed by: ORTHOPAEDIC SURGERY

## 2020-03-06 PROCEDURE — 74011000250 HC RX REV CODE- 250: Performed by: ORTHOPAEDIC SURGERY

## 2020-03-06 PROCEDURE — 77030018836 HC SOL IRR NACL ICUM -A: Performed by: ORTHOPAEDIC SURGERY

## 2020-03-06 PROCEDURE — 74011250636 HC RX REV CODE- 250/636: Performed by: ORTHOPAEDIC SURGERY

## 2020-03-06 PROCEDURE — A4565 SLINGS: HCPCS | Performed by: ORTHOPAEDIC SURGERY

## 2020-03-06 DEVICE — ICONIX 2 NEEDLES WITH INTELLIBRAID TECHNOLOGY, 2.3MM ANCHOR WITH 2 STRANDS #2 FORCE FIBER
Type: IMPLANTABLE DEVICE | Site: SHOULDER | Status: FUNCTIONAL
Brand: ICONIX

## 2020-03-06 DEVICE — 5.5MM PEEK ZIP SUTURE ANCHOR WITH ¿ CIRCLE TAPER NEEDLES, #2 FORCE FIBER
Type: IMPLANTABLE DEVICE | Site: SHOULDER | Status: FUNCTIONAL
Brand: PEEK ZIP

## 2020-03-06 RX ORDER — NEOSTIGMINE METHYLSULFATE 1 MG/ML
INJECTION, SOLUTION INTRAVENOUS AS NEEDED
Status: DISCONTINUED | OUTPATIENT
Start: 2020-03-06 | End: 2020-03-06 | Stop reason: HOSPADM

## 2020-03-06 RX ORDER — EPHEDRINE SULFATE/0.9% NACL/PF 50 MG/5 ML
SYRINGE (ML) INTRAVENOUS AS NEEDED
Status: DISCONTINUED | OUTPATIENT
Start: 2020-03-06 | End: 2020-03-06 | Stop reason: HOSPADM

## 2020-03-06 RX ORDER — CEFAZOLIN SODIUM/WATER 2 G/20 ML
2 SYRINGE (ML) INTRAVENOUS ONCE
Status: COMPLETED | OUTPATIENT
Start: 2020-03-06 | End: 2020-03-06

## 2020-03-06 RX ORDER — SODIUM CHLORIDE 0.9 % (FLUSH) 0.9 %
5-40 SYRINGE (ML) INJECTION EVERY 8 HOURS
Status: DISCONTINUED | OUTPATIENT
Start: 2020-03-06 | End: 2020-03-06 | Stop reason: HOSPADM

## 2020-03-06 RX ORDER — FENTANYL CITRATE 50 UG/ML
100 INJECTION, SOLUTION INTRAMUSCULAR; INTRAVENOUS ONCE
Status: COMPLETED | OUTPATIENT
Start: 2020-03-06 | End: 2020-03-06

## 2020-03-06 RX ORDER — SODIUM CHLORIDE 0.9 % (FLUSH) 0.9 %
5-40 SYRINGE (ML) INJECTION AS NEEDED
Status: DISCONTINUED | OUTPATIENT
Start: 2020-03-06 | End: 2020-03-06 | Stop reason: HOSPADM

## 2020-03-06 RX ORDER — DEXAMETHASONE SODIUM PHOSPHATE 4 MG/ML
INJECTION, SOLUTION INTRA-ARTICULAR; INTRALESIONAL; INTRAMUSCULAR; INTRAVENOUS; SOFT TISSUE
Status: COMPLETED | OUTPATIENT
Start: 2020-03-06 | End: 2020-03-06

## 2020-03-06 RX ORDER — LIDOCAINE HYDROCHLORIDE AND EPINEPHRINE 10; 10 MG/ML; UG/ML
INJECTION, SOLUTION INFILTRATION; PERINEURAL AS NEEDED
Status: DISCONTINUED | OUTPATIENT
Start: 2020-03-06 | End: 2020-03-06 | Stop reason: HOSPADM

## 2020-03-06 RX ORDER — LIDOCAINE HYDROCHLORIDE 20 MG/ML
INJECTION, SOLUTION EPIDURAL; INFILTRATION; INTRACAUDAL; PERINEURAL AS NEEDED
Status: DISCONTINUED | OUTPATIENT
Start: 2020-03-06 | End: 2020-03-06 | Stop reason: HOSPADM

## 2020-03-06 RX ORDER — PROPOFOL 10 MG/ML
INJECTION, EMULSION INTRAVENOUS AS NEEDED
Status: DISCONTINUED | OUTPATIENT
Start: 2020-03-06 | End: 2020-03-06 | Stop reason: HOSPADM

## 2020-03-06 RX ORDER — OXYCODONE HYDROCHLORIDE 5 MG/1
5 TABLET ORAL
Status: DISCONTINUED | OUTPATIENT
Start: 2020-03-06 | End: 2020-03-06 | Stop reason: HOSPADM

## 2020-03-06 RX ORDER — GLYCOPYRROLATE 0.2 MG/ML
INJECTION INTRAMUSCULAR; INTRAVENOUS AS NEEDED
Status: DISCONTINUED | OUTPATIENT
Start: 2020-03-06 | End: 2020-03-06 | Stop reason: HOSPADM

## 2020-03-06 RX ORDER — ROPIVACAINE HYDROCHLORIDE 5 MG/ML
INJECTION, SOLUTION EPIDURAL; INFILTRATION; PERINEURAL
Status: COMPLETED | OUTPATIENT
Start: 2020-03-06 | End: 2020-03-06

## 2020-03-06 RX ORDER — MIDAZOLAM HYDROCHLORIDE 1 MG/ML
2 INJECTION, SOLUTION INTRAMUSCULAR; INTRAVENOUS ONCE
Status: COMPLETED | OUTPATIENT
Start: 2020-03-06 | End: 2020-03-06

## 2020-03-06 RX ORDER — OXYCODONE AND ACETAMINOPHEN 10; 325 MG/1; MG/1
1 TABLET ORAL AS NEEDED
Status: DISCONTINUED | OUTPATIENT
Start: 2020-03-06 | End: 2020-03-06 | Stop reason: HOSPADM

## 2020-03-06 RX ORDER — SODIUM CHLORIDE, SODIUM LACTATE, POTASSIUM CHLORIDE, CALCIUM CHLORIDE 600; 310; 30; 20 MG/100ML; MG/100ML; MG/100ML; MG/100ML
75 INJECTION, SOLUTION INTRAVENOUS CONTINUOUS
Status: DISCONTINUED | OUTPATIENT
Start: 2020-03-06 | End: 2020-03-06 | Stop reason: HOSPADM

## 2020-03-06 RX ORDER — HYDROMORPHONE HYDROCHLORIDE 2 MG/ML
0.5 INJECTION, SOLUTION INTRAMUSCULAR; INTRAVENOUS; SUBCUTANEOUS
Status: DISCONTINUED | OUTPATIENT
Start: 2020-03-06 | End: 2020-03-06 | Stop reason: HOSPADM

## 2020-03-06 RX ORDER — ACETAMINOPHEN 500 MG
1000 TABLET ORAL ONCE
Status: DISCONTINUED | OUTPATIENT
Start: 2020-03-06 | End: 2020-03-06 | Stop reason: HOSPADM

## 2020-03-06 RX ORDER — ROCURONIUM BROMIDE 10 MG/ML
INJECTION, SOLUTION INTRAVENOUS AS NEEDED
Status: DISCONTINUED | OUTPATIENT
Start: 2020-03-06 | End: 2020-03-06 | Stop reason: HOSPADM

## 2020-03-06 RX ORDER — ONDANSETRON 2 MG/ML
INJECTION INTRAMUSCULAR; INTRAVENOUS AS NEEDED
Status: DISCONTINUED | OUTPATIENT
Start: 2020-03-06 | End: 2020-03-06 | Stop reason: HOSPADM

## 2020-03-06 RX ADMIN — MIDAZOLAM 1 MG: 1 INJECTION INTRAMUSCULAR; INTRAVENOUS at 07:13

## 2020-03-06 RX ADMIN — LIDOCAINE HYDROCHLORIDE 100 MG: 20 INJECTION, SOLUTION EPIDURAL; INFILTRATION; INTRACAUDAL; PERINEURAL at 07:18

## 2020-03-06 RX ADMIN — PROPOFOL 150 MG: 10 INJECTION, EMULSION INTRAVENOUS at 07:18

## 2020-03-06 RX ADMIN — FENTANYL CITRATE 50 MCG: 50 INJECTION INTRAMUSCULAR; INTRAVENOUS at 07:14

## 2020-03-06 RX ADMIN — SODIUM CHLORIDE, SODIUM LACTATE, POTASSIUM CHLORIDE, AND CALCIUM CHLORIDE 75 ML/HR: 600; 310; 30; 20 INJECTION, SOLUTION INTRAVENOUS at 07:00

## 2020-03-06 RX ADMIN — Medication 2 G: at 07:25

## 2020-03-06 RX ADMIN — GLYCOPYRROLATE 0.4 MG: 0.2 INJECTION, SOLUTION INTRAMUSCULAR; INTRAVENOUS at 08:22

## 2020-03-06 RX ADMIN — Medication 10 MG: at 07:36

## 2020-03-06 RX ADMIN — MIDAZOLAM 1 MG: 1 INJECTION INTRAMUSCULAR; INTRAVENOUS at 06:59

## 2020-03-06 RX ADMIN — Medication 3 MG: at 08:22

## 2020-03-06 RX ADMIN — DEXAMETHASONE SODIUM PHOSPHATE 5 MG: 4 INJECTION, SOLUTION INTRAMUSCULAR; INTRAVENOUS at 06:59

## 2020-03-06 RX ADMIN — ONDANSETRON 4 MG: 2 INJECTION INTRAMUSCULAR; INTRAVENOUS at 07:48

## 2020-03-06 RX ADMIN — ROCURONIUM BROMIDE 40 MG: 10 INJECTION, SOLUTION INTRAVENOUS at 07:18

## 2020-03-06 RX ADMIN — ROPIVACAINE HYDROCHLORIDE 20 ML: 5 INJECTION, SOLUTION EPIDURAL; INFILTRATION; PERINEURAL at 06:59

## 2020-03-06 RX ADMIN — FENTANYL CITRATE 100 MCG: 50 INJECTION INTRAMUSCULAR; INTRAVENOUS at 06:58

## 2020-03-06 NOTE — OP NOTES
[address] [address]  OPERATIVE REPORT    Name:  Pollo Li  MR#:  [id number]  :  1955  ACCOUNT #:  [de-identified]  DATE OF SERVICE:  2020    PREOPERATIVE DIAGNOSES:  1. High-grade partial thickness rotator cuff tear, right shoulder. 2.  Bicipital strain, right shoulder. 3.  Superior labrum anterior posterior tear, right shoulder. 4.  Adhesive capsulitis, right shoulder. 5.  Acromioclavicular joint arthritis, right shoulder. POSTOPERATIVE DIAGNOSES:  1. High-grade partial thickness rotator cuff tear, right shoulder. 2.  Bicipital strain, right shoulder. 3.  Superior labrum anterior posterior tear, right shoulder. 4.  Adhesive capsulitis, right shoulder. 5.  Acromioclavicular joint arthritis, right shoulder. PROCEDURES PERFORMED:  Examination and manipulation of right shoulder, arthroscopy of right shoulder, arthroscopic subacromial decompression, arthroscopic distal clavicle resection, lysis of adhesions, extensive debridement of SLAP tear, glenohumeral joint, and subacromial space, mini-open rotator cuff repair and biceps tenodesis. SURGEON:  Markel Fragoso. Corky Griggs MD    FIRST Girish Fleming. JOHNATHON Hodgson. Use of a first assistant was necessary to optimize the patient's safety and outcome. First assistant was present during the entire procedure, facilitated with exposure, placement of anchors, and facilitated the repair. ANESTHESIA:  General with an interscalene block. COMPLICATIONS:  None. IMPLANTS:  Hardware utilized are two [place] 5.5 anchors and one Iconix 2.3 anchor. ESTIMATED BLOOD LOSS:  20 mL. PATHOLOGY:  1. Type 2 acromion. 2.  AC joint arthritis. 3.  Type 2 SLAP tear. 4.  Bicipital strain. 5.  High-grade partial thickness articular surface supraspinatus rotator cuff tear with disruption of the anatomic footprint. 6.  Capsulitis. CPT CODES:  07492, [id number], [id number], and [id number] with a modifier AS for assistant surgeon.     ICD-10 CODES:  M23.676, S46.111, S43.431, M19.011, M75.01.    INDICATIONS:  The patient is a 60-year-old female, who fell at [facility] injuring her right wrist and right shoulder. The patient sustained a right distal radius fracture and underwent an ORIF by Dr. Hari Peoples. The patient has persistent right shoulder pain. Preoperative physical exam, radiographs and an MR demonstrate type 2 acromion, degenerative AC joint, high-grade partial thickness cuff tear, SLAP tear, bicipital strain, AC joint arthritis. The patient developed sore, painful, and stiff. The patient has exhausted nonoperative modalities and electively admitted for operative intervention. PROCEDURE:  Following identification of the patient, the patient was taken to the operative suite. Following administration of general anesthesia, interscalene block for postop pain control, 2 g of IV Ancef, measurement of hemoglobin A1c and fasting blood glucose 5.7 and 108 respectively, the patient was then positioned on the operative table in the supine fashion. Right shoulder was examined under anesthesia. The patient was noted to have 0-130 degrees of passive forward elevation, 20 degrees of external rotation to the side, and 70 degrees of external and internal rotation in the 90-degree abducted position. At this point, a gentle manipulation of the right shoulder was then performed. I was able to achieve 0-180 degrees of passive forward elevation, 60 degrees of external rotation to the side, and 90 degrees of external and internal rotation in the 90-degree abducted position. This motion was comparable to the contralateral side. The patient was then carefully positioned in the lateral decubitus position left side down. Axillary roll was placed. Beanbag was inflated. Care was taken to pad both dependent lower and upper extremities. Right arm was then placed in the [employer] traction device in 15 pounds of traction.   Right shoulder was then prepped and draped in the sterile fashion. Subacromial space was then injected with 10 mL of 1% Xylocaine with epinephrine. Scope was introduced into the shoulder. Diagnostic arthroscopy was then commenced. The articular surfaces of the humeral head and glenoid were visualized and noted to be intact. Anterior, posterior, superior, and inferior labrum were visualized. Anterior, posterior, and inferior bands of the IGHL were intact. There was a type 2 SLAP tear superiorly with an unstable biceps anchor. The biceps itself was markedly erythematous. There was diffuse capsulitis throughout the glenohumeral joint. With the use of 4.5 full resector, Oratec wand, all adhesions were lysed until there was abundant mobility in the axillary recess. Undersurface of the rotator cuff was visualized. There was some fraying of the subscapularis without a tear. There was a high-grade partial thickness cuff tear involving undersurface of the supraspinatus with disruption of the anatomic footprint. Scope was then flip-flopped from the posterior to the anterior portal.  Posterior cuff and labrum were visualized. There was diffuse capsulitis. No posterior cuff tear. All adhesions were lysed. The scope was then introduced into the subacromial space. Lateral portal was then established. Hypertrophic hemorrhagic bursal tissue was then resected. Bursal side of the cuff was visualized. There was some fraying. No full-thickness tear, but there was diffuse capsulitis and adhesions and bursitis in the subacromial space. A complete bursectomy was performed and all adhesions were lysed. With the use of an Oratec wand and acromionizing dragan, an arthroscopic subacromial decompression was then performed. This was taken down to the level of the deltoid fascia anteriorly, AC joint posteriorly, contoured from medial to lateral.   Once this was complete, our attention was then turned to resecting the distal clavicle.   The distal 10 mm and 10 mm of the distal clavicle was then resected. Care was taken to preserve the posterior superior capsule. At this point, given the combination of pathology, it was elected to perform a mini-open approach. The lateral portal was extended to 3 cm. Deltoid split was carried up to the acromion. The biceps tendon was identified. It was dissected-free. It was markedly erythematous and partially torn in its extra-articular portion. At this point, the biceps was then mobilized. It was tagged, transected, and tenodesed utilizing one 5.5 [place] anchor and one Iconix 2.3 anchor. This was oversewn using #2 Mersilene sutures. Biceps tenodesis was stable. An additional 5.5 anchor was placed in the greater tuberosity. All limbs of all sutures were  passed and secured. This yielded an excellent cuff repair. Scope was then placed back into the glenohumeral joint. Stump of the biceps and SLAP tear were debrided back to stable structures. Anatomic footprint of the rotator cuff was reestablished. Scope was then placed back on the bursa. Bursal side of the cuff repair was stable. At this point, with the procedure complete, arthroscopic equipment was removed from the shoulder. Portals were approximated using 2-0 nylon horizontal mattress sutures. The lateral wound was closed with 0 Vicryl figure-of-eight sutures and a 2-0 Prolene subcuticular stitch. A sterile dressing was applied. A sling and swathe was applied. The patient was then transferred to the recovery room in stable condition. MD JULIA [email or web address]  D:  03/06/2020 8:39  T:  03/06/2020 12:35  JOB #:  [id number]  CC:   MD Chrystal Manuel NP

## 2020-03-06 NOTE — PERIOP NOTES
C/O of \"heavy pain in chest area\". VSS. Os sat on Room Air 94-95%. EKG-PACU monitor indicating NSR. Dr Tomy Carter notified.

## 2020-03-06 NOTE — ANESTHESIA PREPROCEDURE EVALUATION
Relevant Problems   No relevant active problems       Anesthetic History   No history of anesthetic complications            Review of Systems / Medical History  Patient summary reviewed and pertinent labs reviewed    Pulmonary  Within defined limits                 Neuro/Psych   Within defined limits           Cardiovascular              Hyperlipidemia    Exercise tolerance: >4 METS  Comments: Denies recent CP, SOB or Palpitations   GI/Hepatic/Renal     GERD: well controlled           Endo/Other      Hypothyroidism: well controlled       Other Findings              Physical Exam    Airway  Mallampati: I  TM Distance: 4 - 6 cm  Neck ROM: normal range of motion   Mouth opening: Normal     Cardiovascular  Regular rate and rhythm,  S1 and S2 normal,  no murmur, click, rub, or gallop             Dental  No notable dental hx       Pulmonary  Breath sounds clear to auscultation               Abdominal  GI exam deferred       Other Findings            Anesthetic Plan    ASA: 2  Anesthesia type: general - interscalene block      Post-op pain plan if not by surgeon: peripheral nerve block single    Induction: Intravenous  Anesthetic plan and risks discussed with: Patient and Spouse

## 2020-03-06 NOTE — ANESTHESIA POSTPROCEDURE EVALUATION
Procedure(s):  EXAMINATION AND MANIPULATION RIGHT SHOULDER ARTHROSCOPY RIGHT SHOULDER ARTHROSCOPIC SUBACROMIAL DECOMPRESSION, DISTAL CLAVICLE RESECTION, LYSIS OF ADHESIONS, EXTENSIVE DEBRIDEMENT SLAP TEAR, GLENOHUMERAL JOINT, SUBACROMIAL SPACE, MINI OPEN ROTATOR CUFF REPAIR, BICEPS TENODESIS.     general    Anesthesia Post Evaluation      Multimodal analgesia: multimodal analgesia used between 6 hours prior to anesthesia start to PACU discharge  Patient location during evaluation: PACU  Patient participation: complete - patient participated  Level of consciousness: awake  Pain management: adequate  Airway patency: patent  Anesthetic complications: no  Cardiovascular status: acceptable  Respiratory status: acceptable  Hydration status: acceptable  Post anesthesia nausea and vomiting:  none      Vitals Value Taken Time   /62 3/6/2020  9:52 AM   Temp     Pulse 63 3/6/2020  9:52 AM   Resp 16 3/6/2020  9:52 AM   SpO2 95 % 3/6/2020  9:52 AM

## 2020-03-06 NOTE — H&P
Date of Surgery Update:  Rocio Green was seen and examined. History and physical has been reviewed. The patient has been examined.  There have been no significant clinical changes since the completion of the originally dated History and Physical.    Signed By: Brandy Avina MD     March 6, 2020 6:27 AM

## 2020-03-06 NOTE — DISCHARGE INSTRUCTIONS
INSTRUCTIONS FOLLOWING ARTHROSCOPY SURGERY  Dr. Mukul Mckinney [phone]    ACTIVITY   As tolerated and as directed by your doctor   Elevate surgery site first 48 hours.  Use arm sling or crutches per your doctors instructions.  Bathe or shower as directed by your doctor. DIET   Clear liquids until no nausea or vomiting; then light diet for the first day   Advance to regular diet on second day, unless your doctor orders otherwise.  If nausea and vomiting continues, call your doctor. PAIN   Take pain medication as directed by your doctor.  Call your doctor if pain is NOT relieved by medication.  DO NOT take aspirin or blood thinners until directed by your doctor. DRESSING CARE: Follow all dressing care instructions provided by Dr. Marilee Huddleston will be made by nursing staff.  If you have any problems or concerns, call your doctor as needed. CALL YOUR DOCTOR IF   Excessive bleeding that does not stop after holding mild pressure over the area   Temperature of 101°F or above   Redness, excessive swelling or bruising, and/or green or yellow, smelly discharge from incision    AFTER ANESTHESIA   For the next 24 hours: DO NOT Drive, Drink alcoholic beverages, or Make important decisions.  Be aware of dizziness following anesthesia and while taking pain medication. MEDICATIONS:  Continue home medications as previously prescribed with the following changes: {None:60711::\"none\"}. Cryo Cuff or Iceman 24-48 hours continuously       Shoulder Arthroscopy: What to Expect at [address] arm may be in a sling. You will feel tired for several days. Your shoulder will be swollen, and you may notice that your skin is a different color near the cuts the doctor made (incisions). Your hand and arm may also be swollen. This is normal and will go away in a few days.  Depending on the medicine you had during the surgery, your entire arm may feel numb or like you cannot move it. This goes away in 12 to 24 hours. When you can return to work or your usual routine will depend on your shoulder problem. Most people need 6 weeks or longer to recover. How much time you need depends on the surgery that was done. You may have to limit your activity until your shoulder strength and range of motion return to normal. You may also be in a rehabilitation program (rehab). If you have a desk job, you may be able to return to work a few days after the surgery. If you lift things at work, it may take months before you return to work. This care sheet gives you a general idea about how long it will take for you to recover. But each person recovers at a different pace. Follow the steps below to get better as quickly as possible. How can you care for yourself at home? Activity    · Rest when you feel tired. Getting enough sleep will help you recover. You may be more comfortable if you sleep in a reclining chair. To make your arm and shoulder feel better, keep a thin pillow under the back of your arm while you are lying down.     · Try to walk each day. Start by walking a little more than you did the day before. Bit by bit, increase the amount you walk. Walking boosts blood flow and helps prevent pneumonia and constipation.     · For 2 to 3 weeks, avoid lifting anything heavier than a plate or a glass. You need to give your shoulder time to heal.     · Your arm may be in a sling. You may need to use the sling for a few days to a few weeks. Your doctor will advise you on how long to wear the sling.     · You may take the sling off when you dress or wash.     · Do not use your arm for repeated movements. These include painting, vacuuming, or using a computer. Diet    · You can eat your normal diet.  If your stomach is upset, try bland, low-fat foods like plain rice, broiled chicken, toast, and yogurt.     · Drink plenty of fluids, unless your doctor tells you not to.     · You may notice that your bowel movements are not regular right after your surgery. This is common. Try to avoid constipation and straining with bowel movements. You may want to take a fiber supplement every day. If you have not had a bowel movement after a couple of days, ask your doctor about taking a mild laxative. Medicines    · Your doctor will tell you if and when you can restart your medicines. He or she will also give you instructions about taking any new medicines.     · If you take blood thinners, such as warfarin (Coumadin), clopidogrel (Plavix), or aspirin, be sure to talk to your doctor. He or she will tell you if and when to start taking those medicines again. Make sure that you understand exactly what your doctor wants you to do.     · Take pain medicines exactly as directed. ? If the doctor gave you a prescription medicine for pain, take it as prescribed. ? If you are not taking a prescription pain medicine, ask your doctor if you can take an over-the-counter medicine.     · If you think your pain medicine is making you sick to your stomach:  ? Take your medicine after meals (unless your doctor has told you not to). ? Ask your doctor for a different pain medicine.     · If your doctor prescribed antibiotics, take them as directed. Do not stop taking them just because you feel better. You need to take the full course of antibiotics. Incision care    · If you have a dressing over your incision, keep it clean and dry. You may remove it 2 to 3 days after the surgery.     · If your incision is open to the air, keep the area clean and dry.     · If you have strips of tape on the incision, leave the tape on for a week or until it falls off. Exercise    · You may need rehabilitation. This is a series of exercises you do after your surgery. Rehab helps you get back your shoulder's range of motion and strength.  You will work with your doctor and physical therapist to plan this exercise program. To get the best results, you need to do the exercises correctly and as often and as long as your doctor tells you.    Ice    · To reduce swelling and pain, put ice or a cold pack on your shoulder for 10 to 20 minutes at a time. Do this every 1 to 2 hours. Put a thin cloth between the ice and your skin. Follow-up care is a key part of your treatment and safety. Be sure to make and go to all appointments, and call your doctor if you are having problems. It's also a good idea to know your test results and keep a list of the medicines you take. When should you call for help? Call 911 anytime you think you may need emergency care. For example, call if:    · You passed out (lost consciousness).     · You have severe trouble breathing.     · You have sudden chest pain and shortness of breath, or you cough up blood.    Call your doctor now or seek immediate medical care if:    · Your hand is cool, pale, or numb, or it changes color.     · You are unable to move your fingers, wrist, or elbow.     · You are sick to your stomach or cannot keep fluids down.     · You have pain that does not get better after you take pain medicine.     · You have signs of infection, such as:  ? Increased pain, swelling, warmth, or redness. ? Red streaks leading from the incision. ? Pus draining from the incision. ? A fever.     · You have loose stitches, or your incision comes open.     · Your incision bleeds through your first dressing or is still bleeding 3 days after your surgery.    Watch closely for changes in your health, and be sure to contact your doctor if:    · Your sling feels too tight, and you cannot loosen it.     · You have new or increased swelling in your arm.     · You have new pain that develops in another area of the affected limb. For example, you have pain in your hand or elbow.     · You do not have a bowel movement after taking a laxative.     · You do not get better as expected. Where can you learn more?   Go to http://yesenia-noelle.info/. Enter R840 in the search box to learn more about \"Shoulder Arthroscopy: What to Expect at Home. \"  Current as of: June 26, 2019  Content Version: 12.2  © 3818-2639 [employer]. Care instructions adapted under license by [employer] (which disclaims liability or warranty for this information). If you have questions about a medical condition or this instruction, always ask your healthcare professional. [address] any warranty or liability for your use of this information.

## 2020-03-06 NOTE — ANESTHESIA PROCEDURE NOTES
Peripheral Block    Start time: 3/6/2020 6:53 AM  End time: 3/6/2020 6:59 AM  Performed by: Yessi Morgan MD  Authorized by: Yessi Morgan MD       Pre-procedure: Indications: at surgeon's request and post-op pain management    Preanesthetic Checklist: patient identified, risks and benefits discussed, site marked, timeout performed, anesthesia consent given and patient being monitored    Timeout Time: 06:53          Block Type:   Block Type:   Interscalene  Laterality:  Right  Monitoring:  Standard ASA monitoring, continuous pulse ox, frequent vital sign checks, heart rate, oxygen and responsive to questions  Injection Technique:  Single shot  Procedures: ultrasound guided and nerve stimulator    Patient Position: supine  Prep: chlorhexidine    Location:  Interscalene  Needle Type:  Stimuplex  Needle Gauge:  20 G  Needle Localization:  Anatomical landmarks, nerve stimulator and ultrasound guidance    Assessment:  Number of attempts:  1  Injection Assessment:  Incremental injection every 5 mL, local visualized surrounding nerve on ultrasound, negative aspiration for blood, no intravascular symptoms, no paresthesia and ultrasound image on chart  Patient tolerance:  Patient tolerated the procedure well with no immediate complications

## 2020-03-10 ENCOUNTER — HOSPITAL ENCOUNTER (OUTPATIENT)
Dept: PHYSICAL THERAPY | Age: 65
Discharge: HOME | End: 2020-03-10
Payer: COMMERCIAL

## 2020-03-10 NOTE — PROGRESS NOTES
Patient has been discharged. She had surgery on her shoulder, and would be going to have therapy on her hand and wrist where she is getting her shoulder treated.

## 2020-03-10 NOTE — THERAPY DISCHARGE
Jermaine Rayo  : 1955  Primary: [facility] 83 at [facility]  [address], [address] [address], [place] [address].  [phone]   [phone]          OUTPATIENT OCCUPATIONAL THERAPY:Discharge Summary 3/5/2020   ICD-10: Treatment Diagnosis: Stiffness of right wrist, not elsewhere classified (M25.631)Pain in right wrist (M25.531)Pain in right hand (M79.641)Stiffness of right hand, not elsewhere classified (M25.641)  Precautions/Allergies:   Depo-medrol [methylprednisolone acetate]   TREATMENT PLAN:  Effective Dates: 2020 TO 2020 (90 days). Frequency/Duration: 2-3 times a week for 90 Day(s) MEDICAL/REFERRING DIAGNOSIS:  Other extraarticular fracture of lower end of right radius, subsequent encounter for closed fracture with routine healing [S52.551D]   DATE OF ONSET: 2019  DATE OF SURGERY: 2019  REFERRING PHYSICIAN: Magnolia Fine MD MD Orders: Evaluate and treat: ROM  Return MD Appointment:      INITIAL ASSESSMENT:   Ms. Saurabh Garcia presents with decreased functional use, strength and range of motion of her right hand, wrist and upper extremity that is affecting her independence with activities of daily living and ability to perform job tasks. I feel that Ms. Saurabh Garcia will benefit from skilled occupational therapy to maximize the functional use of her hand, wrist and upper extremity in daily activities and work tasks. PROBLEM LIST (Impacting functional limitations):  1. Pain in right hand and wrist.  2. Decreased motion in right hands and wrist.  3. Decreased strength in right hand. INTERVENTIONS PLANNED: (Treatment may consist of any combination of the following)  1. Modalities that may include fluidotherapy, paraffin, ultrasound, and light therapy. 2. Therapeutic exercise including a home exercise program.  3. Manual therapy. 4. Therapeutic activities.      GOALS: (Goals have been discussed and agreed upon with patient.)  Short-Term Functional Goals: Time Frame: 4 weeks  1. Decrease pain to 5 to allow patient to perform self care tasks. ( GOAL MET MOST OF THE TIME )  2. Increase motion in right hand and wrist by 20 degrees to improve functional use of upper extremity in ADL activities. ( GOAL MET )  3. Increase strength in right hand by 10 pounds to allow patient to  and lift objects during self care activities. ( PROGRESSING )  Discharge Goals: Time Frame: 12 weeks  1. Decrease pain to 3 to allow patient to perform all household and work tasks. ( GOAL MET MOST OF THE TIME )  2. Increase motion in right hand and wrist by 30 degreees to allow patient to perform all ADL activities. ( PROGRESSING )  3. Increase strength in right hand by 20 pounds to allow patient to , lift, hold, and carry heavy objects. ( GOAL NOT MET )    OUTCOME MEASURE:   Tool Used: Disabilities of the Arm, Shoulder and Hand (DASH) Questionnaire - Quick Version  Score:  Initial: 53/55  Most Recent: X/55 (Date: -- )   Interpretation of Score: The DASH is designed to measure the activities of daily living in person's with upper extremity dysfunction or pain. Each section is scored on a 1-5 scale, 5 representing the greatest disability. The scores of each section are added together for a total score of 55. MEDICAL NECESSITY:   · Patient is expected to demonstrate progress in strength and range of motion to increase independence with ADL, household and work activities. .  REASON FOR SERVICES/OTHER COMMENTS:  · The patient has limited motion, strength and function in her right U.E..  Total Duration:  OT Patient Time In/Time Out  Time In: 0230  Time Out: 0330    Rehabilitation Potential For Stated Goals: Good  Regarding Amber Combs's therapy, I certify that the treatment plan above will be carried out by a therapist or under their direction.   Thank you for this referral,  Ana Velasco OT     Referring Physician Signature: Leila Simpson MD No Signature is Required for this note. PAIN/SUBJECTIVE:   Initial: Pain Intensity 1: 4  Pain Location 1: Hand, Wrist  Pain Orientation 1: Right   Post Session:  3/10   OCCUPATIONAL PROFILE & HISTORY:   History of Injury/Illness (Reason for Referral): The patient fell when walking at [employer]. Past Medical History/Comorbidities:   Ms. Shane House  has a past medical history of Acid reflux, Arthritis, GERD (gastroesophageal reflux disease), Hypercholesterolemia, Hypothyroidism (03/19/2014), Postmenopausal, Right wrist pain, and Vegetarian. She also has no past medical history of Aneurysm ([address] Arrhythmia, Asthma, Autoimmune disease ([address] CAD (coronary artery disease), Cancer ([address] Chronic kidney disease, Chronic obstructive pulmonary disease ([address] Chronic pain, Coagulation disorder ([address] Diabetes ([address] Difficult intubation, Endocarditis, Heart failure ([address] Hypertension, Liver disease, Malignant hyperthermia due to anesthesia, Morbid obesity ([address] Nausea & vomiting, Nicotine vapor product user, Non-nicotine vapor product user, Pseudocholinesterase deficiency, Psychiatric disorder, PUD (peptic ulcer disease), Rheumatic fever, Seizures ([address] Sleep apnea, Stroke ([address] or Thromboembolus ([address] Ms. Shane House  has a past surgical history that includes hx wisdom teeth extraction (1983); hx wrist fracture tx (Right, 12/31/2019); and hx appendectomy. Social History/Living Environment:      Prior Level of Function/Work/Activity:  independent  Dominant Side:         RIGHT   Ambulatory/Rehab Services H2 Model Falls Risk Assessment   Risk Factors:       (5)  History of Recent Falls [w/in 3 months] Ability to Rise from Chair:       (0)  Ability to rise in a single movement   Falls Prevention Plan:       No modifications necessary   Total: (5 or greater = High Risk): 5   ©2010 [employer]. All Rights Reserved. [facility] Patent #[id number].  Federal Law prohibits the replication, distribution or use without written permission from [facility]   Current Medications:       Current Outpatient Medications:     Calcium-Cholecalciferol, D3, (CALCIUM 600 WITH VITAMIN D3) 600 mg(1,500mg) -400 unit cap, Take  by mouth two (2) times a day., Disp: , Rfl:     atorvastatin (LIPITOR) 20 mg tablet, Take 20 mg by mouth nightly. Indications: high cholesterol, Disp: , Rfl:     ascorbic acid, vitamin C, (VITAMIN C) 1,000 mg tablet, Take 1,000 mg by mouth daily. , Disp: , Rfl:     omeprazole (PRILOSEC) 20 mg capsule, Take 20 mg by mouth daily as needed. Take / use AM day of surgery  per anesthesia protocols. Indications: gastroesophageal reflux disease, Disp: , Rfl:     levothyroxine (SYNTHROID) 75 mcg tablet, TAKE ONE TABLET BY MOUTH EVERY DAY (Patient taking differently: Take 75 mcg by mouth Daily (before breakfast). TAKE ONE TABLET BY MOUTH EVERY DAY; Take / use AM day of surgery  per anesthesia protocols. Indications: a condition with low thyroid hormone levels), Disp: 90 Tab, Rfl: 3    acetaminophen (TYLENOL EXTRA STRENGTH) 500 mg tablet, Take 500 mg by mouth every six (6) hours as needed for Pain., Disp: , Rfl:     multivitamin with minerals (HAIR,SKIN AND NAILS PO), Take 1 Tab by mouth daily. , Disp: , Rfl:     ergocalciferol (ERGOCALCIFEROL) 50,000 unit capsule, Take 1 Cap by mouth every seven (7) days. (Patient taking differently: Take 50,000 Units by mouth every seven (7) days. Indications: low vitamin D levels), Disp: 12 Cap, Rfl: 3   Date Last Reviewed:  3/5/2020   Complexity Level: Brief history (0):  LOW COMPLEXITY   ASSESSMENT OF OCCUPATIONAL PERFORMANCE:   RANGE OF MOTION:     · AROM: The patient has limited motion in her right hand and is unable to make a full composite fist. Right wrist motion is as follows: flexion 40, extension 30, U.D. 30, R.D. 20, supination 50, pronation 90 degrees. STRENGTH:  STRENGTH: Right 10 lbs. Left 40 lbs. LAT PINCH : Right 6 lbs. Left 15 lbs. 3 JAW PIOTR: Right 6 lbs. Left 18 lbs. SENSATION:  The patient reports numbness and tingling in her right hand. Physical Skills Involved:  1. Range of Motion  2. Strength  3. Sensation  4. Pain (acute)  5. Edema Cognitive Skills Affected (resulting in the inability to perform in a timely and safe manner):   1. none Psychosocial Skills Affected:  1. none   Number of elements that affect the Plan of Care[de-identified] 5+:  HIGH COMPLEXITY   CLINICAL DECISION MAKING:   Clinical Decision-Making Assessment:     Assessment process, impact of co-morbidities, assessment modification\need for assistance, and selection of interventions: Analytical Complexity:LOW COMPLEXITY

## 2020-03-12 ENCOUNTER — APPOINTMENT (OUTPATIENT)
Dept: PHYSICAL THERAPY | Age: 65
End: 2020-03-12
Payer: COMMERCIAL

## 2020-06-09 RX ORDER — SODIUM CHLORIDE 0.9 % (FLUSH) 0.9 %
5-40 SYRINGE (ML) INJECTION EVERY 8 HOURS
Status: CANCELLED | OUTPATIENT
Start: 2020-06-09

## 2020-06-09 RX ORDER — SODIUM CHLORIDE 0.9 % (FLUSH) 0.9 %
5-40 SYRINGE (ML) INJECTION AS NEEDED
Status: CANCELLED | OUTPATIENT
Start: 2020-06-09

## 2020-06-17 NOTE — H&P
Erinn Leary  1955  female    Follow up:  Right distal radius fracture ORIF 6 months ago with persistent wrist pain    HPI: Patient is a 62year old female who is following up for Right distal radius fracture ORIF 6 months ago with persistent wrist pain. Last visit we provided home exercise program and we also discussed a jazz splint to help with supination. The current symptoms are no better, she continues to have pain on the right wrist and palm, she reports the pain shoots up into the palm from the wrist mostly, all the pain is on the volar side, she still has some limitations in supination, her pain is mostly located on the volar side of the wrist, some  weakness, alleviated by rest, aggravated by gripping lifting things . ?????. Past Medical and Surgical, Family and Social History: This has been reviewed and documented on the patient intake form. See scanned documents for further information. Medications: Atorvastatin Calcium (20 MG); Levothyroxine Sodium;Meloxicam (15 MG, Take 1 tablet(s) by mouth once a day for 30 days);oxyCODONE HCl (5 MG, Take 1 tablet(s) by mouth every 4-6 hours as needed [PRN]);Vitamin D2  Allergies: NKDA  All medical history, medications and allergies have been discussed with the patient today. ROS:  This has been reviewed and documented on the patient intake form. See scanned documents for further information. Patient reports no change in past medical & surgical history, medications, allergies, social history, family history and review of systems since last visit    Physical Examination: Patient is a healthy appearing female in no acute distress. Bilateral upper limbs have equal and intact peripheral pulses. Skin does not demonstrate any rashes or lesions.  Examination of the right upper extremity demonstrates normal sensation in all nerve distributions, good cap refill in all fingers, mild tenderness to palpation of the right thumb CMC joint, moderate to severe translocation over the volar rim of the radius with palpable crepitus with flexion of the fingers suggestive of flexor tenosynovitis, no pain at the radial styloid, no pain at the DRUJ, she has 60 degrees of supination and 80 degrees of pronation. Examination of the right shoulder demonstrates forward elevation of 100 degrees, external rotation to 70 degrees and internal rotation to 25 degrees, shoulder examination is quite difficult because she has a lot of scapular motion. Imaging:  X-rays were again reviewed which demonstrate a healed right distal radius fracture, the volar plate is not sitting up against the bone, there is a screw that appears to be directed towards the DRUJ  ? Assessment: S52.551D Other extraarticular fracture of lower end of right radius, subsequent encounter for closed fracture with routine healing  '12/25/19'     Plan: We discussed the diagnosis and different treatment options. We again discussed all treatment options, I do believe the plate is bothering her some but I cannot guarantee any outcome, I explained that her chances of improving the volar wrist pain are definitely above 50%, this will also prevent a flexor tendon injury in the future. Dr. ricci came into the visit as well and he requested that I do a shoulder manipulation under anesthesia since he will be able to  be there for the procedure. After thorough discussion, the patient elects to proceed with RIGHT WRIST HARDWARE REMOVAL AND RIGHT SHOULDER MANIPULATION UNDER ANESTHESIA. Patient voiced accordance and understanding of the information provided and the formulated plan. All questions were answered to the patient's satisfaction during the encounter. ?????         Patient understands risks and benefits of RIGHT WRIST HARDWARE REMOVAL AND RIGHT SHOULDER MANIPULATION UNDER ANESTHESIA including but not limited to nerve injury, vessel injury, infection, failure to achieve desired results and possible need for additional surgery. Patient understands and wishes to proceed with surgery.     On Exam:   The patient is alert and oriented; ;   Lung auscultation is clear bilaterally   Heart has RRR without murmurs    Rubia Jason MD  06/17/20  6:09 PM

## 2020-06-18 ENCOUNTER — APPOINTMENT (OUTPATIENT)
Dept: GENERAL RADIOLOGY | Age: 65
End: 2020-06-18
Attending: ORTHOPAEDIC SURGERY
Payer: COMMERCIAL

## 2020-06-18 ENCOUNTER — ANESTHESIA EVENT (OUTPATIENT)
Dept: SURGERY | Age: 65
End: 2020-06-18
Payer: COMMERCIAL

## 2020-06-18 ENCOUNTER — ANESTHESIA (OUTPATIENT)
Dept: SURGERY | Age: 65
End: 2020-06-18
Payer: COMMERCIAL

## 2020-06-18 ENCOUNTER — HOSPITAL ENCOUNTER (OUTPATIENT)
Age: 65
Setting detail: OUTPATIENT SURGERY
Discharge: HOME | End: 2020-06-18
Attending: ORTHOPAEDIC SURGERY | Admitting: ORTHOPAEDIC SURGERY
Payer: COMMERCIAL

## 2020-06-18 VITALS
DIASTOLIC BLOOD PRESSURE: 65 MMHG | OXYGEN SATURATION: 94 % | HEART RATE: 66 BPM | WEIGHT: 140 LBS | RESPIRATION RATE: 16 BRPM | BODY MASS INDEX: 28.28 KG/M2 | TEMPERATURE: 97.5 F | SYSTOLIC BLOOD PRESSURE: 129 MMHG

## 2020-06-18 PROCEDURE — 74011250636 HC RX REV CODE- 250/636: Performed by: ANESTHESIOLOGY

## 2020-06-18 PROCEDURE — 74011250636 HC RX REV CODE- 250/636

## 2020-06-18 PROCEDURE — 74011250637 HC RX REV CODE- 250/637: Performed by: ANESTHESIOLOGY

## 2020-06-18 PROCEDURE — 76942 ECHO GUIDE FOR BIOPSY: CPT | Performed by: ORTHOPAEDIC SURGERY

## 2020-06-18 PROCEDURE — 77030003602 HC NDL NRV BLK BBMI -B: Performed by: ANESTHESIOLOGY

## 2020-06-18 PROCEDURE — 77030010507 HC ADH SKN DERMBND J&J -B: Performed by: ORTHOPAEDIC SURGERY

## 2020-06-18 PROCEDURE — 74011000250 HC RX REV CODE- 250: Performed by: NURSE ANESTHETIST, CERTIFIED REGISTERED

## 2020-06-18 PROCEDURE — 77030033138 HC SUT PGA STRATFX J&J -B: Performed by: ORTHOPAEDIC SURGERY

## 2020-06-18 PROCEDURE — 77030000032 HC CUF TRNQT ZIMM -B: Performed by: ORTHOPAEDIC SURGERY

## 2020-06-18 PROCEDURE — 74011250636 HC RX REV CODE- 250/636: Performed by: NURSE ANESTHETIST, CERTIFIED REGISTERED

## 2020-06-18 PROCEDURE — 76010000160 HC OR TIME 0.5 TO 1 HR INTENSV-TIER 1: Performed by: ORTHOPAEDIC SURGERY

## 2020-06-18 PROCEDURE — 76210000020 HC REC RM PH II FIRST 0.5 HR: Performed by: ORTHOPAEDIC SURGERY

## 2020-06-18 PROCEDURE — 76210000006 HC OR PH I REC 0.5 TO 1 HR: Performed by: ORTHOPAEDIC SURGERY

## 2020-06-18 PROCEDURE — A4565 SLINGS: HCPCS | Performed by: ORTHOPAEDIC SURGERY

## 2020-06-18 PROCEDURE — 73100 X-RAY EXAM OF WRIST: CPT

## 2020-06-18 PROCEDURE — 77030040361 HC SLV COMPR DVT MDII -B: Performed by: ORTHOPAEDIC SURGERY

## 2020-06-18 PROCEDURE — 77030018842 HC SOL IRR SOD CL 9% BAXT -A: Performed by: ORTHOPAEDIC SURGERY

## 2020-06-18 PROCEDURE — 77030010509 HC AIRWY LMA MSK TELE -A: Performed by: ANESTHESIOLOGY

## 2020-06-18 PROCEDURE — 76060000032 HC ANESTHESIA 0.5 TO 1 HR: Performed by: ORTHOPAEDIC SURGERY

## 2020-06-18 PROCEDURE — 77030002933 HC SUT MCRYL J&J -A: Performed by: ORTHOPAEDIC SURGERY

## 2020-06-18 PROCEDURE — 76010010054 HC POST OP PAIN BLOCK: Performed by: ORTHOPAEDIC SURGERY

## 2020-06-18 RX ORDER — SODIUM CHLORIDE 0.9 % (FLUSH) 0.9 %
5-40 SYRINGE (ML) INJECTION AS NEEDED
Status: DISCONTINUED | OUTPATIENT
Start: 2020-06-18 | End: 2020-06-18 | Stop reason: HOSPADM

## 2020-06-18 RX ORDER — SODIUM CHLORIDE, SODIUM LACTATE, POTASSIUM CHLORIDE, CALCIUM CHLORIDE 600; 310; 30; 20 MG/100ML; MG/100ML; MG/100ML; MG/100ML
100 INJECTION, SOLUTION INTRAVENOUS CONTINUOUS
Status: DISCONTINUED | OUTPATIENT
Start: 2020-06-18 | End: 2020-06-18 | Stop reason: HOSPADM

## 2020-06-18 RX ORDER — ONDANSETRON 2 MG/ML
INJECTION INTRAMUSCULAR; INTRAVENOUS AS NEEDED
Status: DISCONTINUED | OUTPATIENT
Start: 2020-06-18 | End: 2020-06-18 | Stop reason: HOSPADM

## 2020-06-18 RX ORDER — LIDOCAINE HYDROCHLORIDE 10 MG/ML
0.3 INJECTION INFILTRATION; PERINEURAL ONCE
Status: DISCONTINUED | OUTPATIENT
Start: 2020-06-18 | End: 2020-06-18 | Stop reason: HOSPADM

## 2020-06-18 RX ORDER — EPINEPHRINE 1 MG/ML
INJECTION, SOLUTION, CONCENTRATE INTRAVENOUS
Status: COMPLETED | OUTPATIENT
Start: 2020-06-18 | End: 2020-06-18

## 2020-06-18 RX ORDER — ROPIVACAINE HYDROCHLORIDE 5 MG/ML
INJECTION, SOLUTION EPIDURAL; INFILTRATION; PERINEURAL
Status: COMPLETED | OUTPATIENT
Start: 2020-06-18 | End: 2020-06-18

## 2020-06-18 RX ORDER — LIDOCAINE HYDROCHLORIDE 20 MG/ML
INJECTION, SOLUTION EPIDURAL; INFILTRATION; INTRACAUDAL; PERINEURAL AS NEEDED
Status: DISCONTINUED | OUTPATIENT
Start: 2020-06-18 | End: 2020-06-18 | Stop reason: HOSPADM

## 2020-06-18 RX ORDER — DEXAMETHASONE SODIUM PHOSPHATE 4 MG/ML
INJECTION, SOLUTION INTRA-ARTICULAR; INTRALESIONAL; INTRAMUSCULAR; INTRAVENOUS; SOFT TISSUE AS NEEDED
Status: DISCONTINUED | OUTPATIENT
Start: 2020-06-18 | End: 2020-06-18 | Stop reason: HOSPADM

## 2020-06-18 RX ORDER — PROPOFOL 10 MG/ML
INJECTION, EMULSION INTRAVENOUS AS NEEDED
Status: DISCONTINUED | OUTPATIENT
Start: 2020-06-18 | End: 2020-06-18 | Stop reason: HOSPADM

## 2020-06-18 RX ORDER — SCOLOPAMINE TRANSDERMAL SYSTEM 1 MG/1
1 PATCH, EXTENDED RELEASE TRANSDERMAL
Status: DISCONTINUED | OUTPATIENT
Start: 2020-06-18 | End: 2020-06-18 | Stop reason: HOSPADM

## 2020-06-18 RX ORDER — OXYCODONE HYDROCHLORIDE 5 MG/1
10 TABLET ORAL
Status: DISCONTINUED | OUTPATIENT
Start: 2020-06-18 | End: 2020-06-18 | Stop reason: HOSPADM

## 2020-06-18 RX ORDER — SODIUM CHLORIDE 0.9 % (FLUSH) 0.9 %
5-40 SYRINGE (ML) INJECTION EVERY 8 HOURS
Status: DISCONTINUED | OUTPATIENT
Start: 2020-06-18 | End: 2020-06-18 | Stop reason: HOSPADM

## 2020-06-18 RX ORDER — MIDAZOLAM HYDROCHLORIDE 1 MG/ML
2 INJECTION, SOLUTION INTRAMUSCULAR; INTRAVENOUS
Status: COMPLETED | OUTPATIENT
Start: 2020-06-18 | End: 2020-06-18

## 2020-06-18 RX ORDER — HYDROMORPHONE HYDROCHLORIDE 2 MG/ML
0.5 INJECTION, SOLUTION INTRAMUSCULAR; INTRAVENOUS; SUBCUTANEOUS
Status: DISCONTINUED | OUTPATIENT
Start: 2020-06-18 | End: 2020-06-18 | Stop reason: HOSPADM

## 2020-06-18 RX ORDER — ACETAMINOPHEN 500 MG
1000 TABLET ORAL ONCE
Status: COMPLETED | OUTPATIENT
Start: 2020-06-18 | End: 2020-06-18

## 2020-06-18 RX ORDER — CEFAZOLIN SODIUM/WATER 2 G/20 ML
2 SYRINGE (ML) INTRAVENOUS ONCE
Status: COMPLETED | OUTPATIENT
Start: 2020-06-18 | End: 2020-06-18

## 2020-06-18 RX ADMIN — EPINEPHRINE 0.1 MG: 1 INJECTION, SOLUTION INTRAMUSCULAR; SUBCUTANEOUS at 11:19

## 2020-06-18 RX ADMIN — ACETAMINOPHEN 1000 MG: 500 TABLET, FILM COATED ORAL at 11:02

## 2020-06-18 RX ADMIN — ONDANSETRON 4 MG: 2 INJECTION INTRAMUSCULAR; INTRAVENOUS at 11:59

## 2020-06-18 RX ADMIN — LIDOCAINE HYDROCHLORIDE 80 MG: 20 INJECTION, SOLUTION EPIDURAL; INFILTRATION; INTRACAUDAL; PERINEURAL at 11:37

## 2020-06-18 RX ADMIN — SODIUM CHLORIDE, SODIUM LACTATE, POTASSIUM CHLORIDE, AND CALCIUM CHLORIDE 100 ML/HR: 600; 310; 30; 20 INJECTION, SOLUTION INTRAVENOUS at 11:02

## 2020-06-18 RX ADMIN — Medication 2 G: at 11:43

## 2020-06-18 RX ADMIN — DEXAMETHASONE SODIUM PHOSPHATE 4 MG: 4 INJECTION, SOLUTION INTRAMUSCULAR; INTRAVENOUS at 11:59

## 2020-06-18 RX ADMIN — PHENYLEPHRINE HYDROCHLORIDE 400 MCG: 10 INJECTION INTRAVENOUS at 11:58

## 2020-06-18 RX ADMIN — ROPIVACAINE HYDROCHLORIDE 20 ML: 150 INJECTION, SOLUTION EPIDURAL; INFILTRATION; PERINEURAL at 11:19

## 2020-06-18 RX ADMIN — PROPOFOL 180 MG: 10 INJECTION, EMULSION INTRAVENOUS at 11:37

## 2020-06-18 RX ADMIN — MIDAZOLAM 2 MG: 1 INJECTION INTRAMUSCULAR; INTRAVENOUS at 11:14

## 2020-06-18 RX ADMIN — PHENYLEPHRINE HYDROCHLORIDE 200 MCG: 10 INJECTION INTRAVENOUS at 11:56

## 2020-06-18 RX ADMIN — PHENYLEPHRINE HYDROCHLORIDE 200 MCG: 10 INJECTION INTRAVENOUS at 11:49

## 2020-06-18 NOTE — DISCHARGE INSTRUCTIONS
Oxycodone, Rapid Release (By mouth)   Oxycodone Hydrochloride (xl-e-JCH-done layne-droe-KLOR-javon)  Treats moderate to severe pain. This medicine is a narcotic pain reliever. Brand Name(s): Oxaydo, Oxy IR, Roxicodone   There may be other brand names for this medicine. When This Medicine Should Not Be Used: This medicine is not right for everyone. Do not use it if you had an allergic reaction to oxycodone, codeine, hydrocodone, dihydrocodeine, or morphine, or you have a stomach or bowel blockage. How to Use This Medicine:   Capsule, Liquid, Tablet  · Take your medicine as directed. Your dose may need to be changed several times to find what works best for you. · An overdose can be dangerous. Follow directions carefully so you do not get too much medicine at one time. · Oral liquid: Measure the oral liquid medicine with a marked measuring spoon, oral syringe, or medicine cup. · Oxaydo® tablet: Swallow it whole with enough water to swallow it completely. Do not break, crush, chew, or dissolve it. Do not wet the tablet before you put it in your mouth. · This medicine should come with a Medication Guide. Ask your pharmacist for a copy if you do not have one. · Missed dose: Take a dose as soon as you remember. If it is almost time for your next dose, wait until then and take a regular dose. Do not take extra medicine to make up for a missed dose. · Store the medicine in a closed container at room temperature, away from heat, moisture, and direct light. Store the medicine in a secure place to prevent others from getting it. Ask your pharmacist about the best way to dispose of medicine you do not use. Drugs and Foods to Avoid:   Ask your doctor or pharmacist before using any other medicine, including over-the-counter medicines, vitamins, and herbal products. · Do not use this medicine if you are using or have used an MAO inhibitor within the past 14 days. · Some medicines can affect how oxycodone works.  Tell your doctor if you are using any of the following:  ¨ Amiodarone, carbamazepine, erythromycin, ketoconazole, phenytoin, quinidine, rifampin, ritonavir  ¨ Diuretic (water pill)  ¨ Medicine to treat depression or anxiety  ¨ Medicine to treat migraine headaches  ¨ Phenothiazine medicine  · Tell your doctor if you use anything else that makes you sleepy. Some examples are allergy medicine, narcotic pain medicine, and alcohol. Tell your doctor if you are using buprenorphine, butorphanol, nalbuphine, pentazocine, or a muscle relaxer. · Do not drink alcohol while you are using this medicine. Warnings While Using This Medicine:   · Tell your doctor if you are pregnant or breastfeeding, or if you have kidney disease, liver disease, heart disease, low blood pressure, lung disease or breathing problems (such as asthma, COPD), scoliosis, an enlarged prostate or trouble urinating, an underactive thyroid, Rell disease, gallbladder or pancreas problems, or digestion problems. Tell your doctor if you have a history of head injury, brain tumor, mental health problems, seizures, or alcohol or drug addiction. · This medicine may cause the following problems:  ¨ High risk of overdose, which can lead to death  ¨ Respiratory depression (serious breathing problem that can be life-threatening)  ¨ Serotonin syndrome, when used with certain medicines  · This medicine may make you dizzy, drowsy, or faint. Do not drive or do anything else that could be dangerous until you know how this medicine affects you. Sit or lie down if you feel dizzy. Stand up carefully. · This medicine can be habit-forming. Do not use more than your prescribed dose. Call your doctor if you think your medicine is not working. · Do not stop using this medicine suddenly. Your doctor will need to slowly decrease your dose before you stop it completely. · This medicine may cause constipation, especially with long-term use.  Ask your doctor if you should use a laxative to prevent and treat constipation. Drink plenty of liquids to help avoid constipation. · This medicine could cause infertility. Talk with your doctor before using this medicine if you plan to have children. · Keep all medicine out of the reach of children. Never share your medicine with anyone. Possible Side Effects While Using This Medicine:   Call your doctor right away if you notice any of these side effects:  · Allergic reaction: Itching or hives, swelling in your face or hands, swelling or tingling in your mouth or throat, chest tightness, trouble breathing  · Anxiety, restlessness, fast heartbeat, fever, sweating, muscle spasms, twitching, nausea, vomiting, diarrhea, seeing or hearing things that are not there  · Blue lips, fingernails, or skin, trouble breathing  · Extreme dizziness or weakness, shallow breathing, slow heartbeat, sweating, cold or clammy skin, seizures  · Lightheadedness, dizziness, fainting  · Severe constipation, stomach pain  If you notice these less serious side effects, talk with your doctor:   · Mild constipation  · Sleepiness, tiredness  If you notice other side effects that you think are caused by this medicine, tell your doctor. Call your doctor for medical advice about side effects. You may report side effects to FDA at 7-368-FDA-2495  © 2017 Formerly named [facility] Information is for End User's use only and may not be sold, redistributed or otherwise used for commercial purposes. The above information is an  only. It is not intended as medical advice for individual conditions or treatments. Talk to your doctor, nurse or pharmacist before following any medical regimen to see if it is safe and effective for you. What to Expect After a Nerve Block  Nerve blocks affect many types of nerves. The affected nerves control movement, pain, and normal sensation.  This causes feelings such as:    · Weakness  · Numbness  · Tingling  · Heaviness  · A feeling that your arm or leg has \"fallen asleep\"    A nerve block can last for 24-48 hours, depending on the medications used. Usually the weakness wears off first, and then you feel a numb/tingly sensation. Finally, the pain may come back. This can happen in any order. If you had a shoulder block, you may have other symptoms such as:    · Mild shortness of breath  · A hoarse voice  · Blurry vision  · Unequal pupils  · Drooping of your face on the same side as the nerve block    These are common and expected side effects of this type of nerve block. Symptoms usually go away within 12 hours. If you have severe or prolonged shortness of breath, please go to the nearest emergency room. Pain Medication  If needed, your surgeon will give you a prescription for pain medication. Nerve blocks sometimes wear off during the night. It is a good idea to take your pain medicine before going to sleep so you won't wake up with pain. The idea is to have pain medicine in your body before the nerve block wears off. To help prevent nausea, eat something before taking the pain medicine. Once a nerve block starts to wear off, it is usually completely gone within 60 minutes. It is important to have pain medicine in your system before the block wears off completely. Helpful Tips to Protect the Part of Your Body That Is Numb  After a nerve block, you cannot feel pain, pressure, or extremes in temperature. Because your arm or leg is numb, it is more at risk for injury. For example, if working near a hot oven, you could burn your arm or leg without knowing it. Cont'd  Helpful Tips to Protect the Part of Your Body That Is Numb    Here are some hints to help protect your limb while it is numb:    · While you are awake, try to change positions of your arm or leg often. This will help you avoid putting too much pressure on the limb for long periods of time.      · While sleeping, pad the blocked limb with pillows to avoid placing too much pressure on the limb. · If you have a cast or a tight dressing, check the color of your finger/toes every couple hours. Call your doctor if they look discolored. · If you had a shoulder, arm, or hand nerve block, you may go home with a sling. The sling will help to keep your arm in the ideal position. Wear the sling at all times until feeling returns. If you do not have a sling, watch the position of the blocked arm to make sure it is in a safe location. · If you had a leg or foot block, walk with crutches and assistance until the block wears off completely. Bearing weight on a partially numb leg may result in a fall and further injury. · Ask your family or support people to help with the above hints. Postoperative  Instructions:      Weightbearing or Lifting:  ***Limit  weight  lifting  to  less  than  2  pounds  (coffee  mug)  for  the  first  2  weeks  after  surgery. Dressing  instructions:    Keep  your  dressing  and/or  splint  clean  and  dry  at  all  times. You  can  remove  your  dressing  on  post-operative  day  #5  and  change  with  a  dry/sterile  dressing  or  Band-Aids  as  needed  thereafter. Showering  Instructions:  May  shower  But keep surgical dressing clean and dry until removed as explained above. After dressing is removed, you may allow soapy water to run through the incision during showers but do not scrub. After each shower, pat dry and apply a dry dressing. Do  not  soak  your  Incision in still water or bathtub  for  3  weeks  after  surgery. If  the  incision  gets  wet otherwise,  pat  dry  and  do  not  scrub  the  incision. Do  not  apply  cream  or  lotion  to  incision      Pain  Control:  - You  have  been  given  a  prescription  to  be  taken  as  directed  for  post-operative  pain  control.     In  addition,  elevate  the  operative  extremity  above  the  heart  at  all  times  to  prevent  swelling  and throbbing  pain. - If you develop constipation while taking narcotic pain medications (Norco, Hydrocodone, Percocet, Oxycodone, Dilaudid, Hydromorphone) take  over-the-counter  Colace,  100mg  by  mouth  twice  a  Day. - Nausea  is  a  common  side  effect  of  many  pain  medications. You  will  want  to  eat something  before  taking  your  pain  medicine  to  help  prevent  Nausea. - If  you  are  taking  a  prescription  pain  medication  that  contains  acetaminophen,  we  recommend  that  you  do  not  take  additional  over  the  counter  acetaminophen  (Tylenol®). Other  pain  relieving  options:   - Using  a  cold  pack  to  ice  the  affected  area  a  few  times  a  day  (15  to  20  minutes  at  a  time)  can  help  to  relieve  pain,  reduce  swelling  and  bruising.      - Elevation  of  the  affected  area  can  also  help  to  reduce  pain  and  swelling. Did  you  receive  a  nerve  Block? A  nerve  block  can  provide  pain  relief  for  one  hour  to  two  days  after  your  surgery. As  long  as  the  nerve  block  is  working,  you  will  experience  little  or  no  sensation  in  the  area  the  surgeon  operated  on. As  the  nerve  block  wears  off,  you  will  begin  to  experience  pain  or  discomfort. It  is  very  important  that  you  begin  taking  your  prescribed  pain  medication  before  the  nerve  block  fully  wears  off. The first sign that the nerve block is wearing off is tingling in your fingers. Treating  your  pain  at  the  first  sign  of  the  block  wearing  off  will  ensure  your  pain  is  better  controlled  and  more  tolerable  when  full-sensation  returns. Do  not  wait  until  the  pain  is  intolerable,  as  the  medicine  will  be  less  effective. It  is  better  to  treat  pain  in  advance  than  to  try  and  catch  up.         General  Anesthesia or Sedation:      If  you  did  not  receive  a  nerve  block  during  your surgery,  you  will  need  to  start  taking  your  pain  medication  shortly  after  your  surgery  and  should  continue  to  do  so  as  prescribed  by  your  surgeon. Please  call  [phone]  with any concern and ask to speak with Jack Smyth. Concerning problems include:      -  Excessive  redness  of  the  incisions      -  Drainage  for  more  than  2  Days after surgery or any foul smelling drainage  -  Fever  of  more  than  101.5  F      Please  call  [phone]  if  you  do  not  receive  or  are  unsure  of  your  first  follow-up  appointment. You  should  see  the  doctor  10-14  days  after  your  Surgery. Thank you for choosing me and [address] [place] [address] for your care. I will go above and beyond to ensure you receive the best care possible. Americo Bell MD, PhD      ACTIVITY  · As tolerated and as directed by your doctor. · Bathe or shower as directed by your doctor. DIET  · Clear liquids until no nausea or vomiting; then light diet for the first day. · Advance to regular diet on second day, unless your doctor orders otherwise. · If nausea and vomiting continues, call your doctor. PAIN  · Take pain medication as directed by your doctor. · Call your doctor if pain is NOT relieved by medication. · DO NOT take aspirin of blood thinners unless directed by your doctor. CALL YOUR DOCTOR IF   · Excessive bleeding that does not stop after holding pressure over the area  · Temperature of 101 degrees F or above  · Excessive redness, swelling or bruising, and/ or green or yellow, smelly discharge from incision    AFTER ANESTHESIA   · For the first 24 hours: DO NOT Drive, Drink alcoholic beverages, or Make important decisions. · Be aware of dizziness following anesthesia and while taking pain medication.          DISCHARGE SUMMARY from Nurse    PATIENT INSTRUCTIONS:    After general anesthesia or intravenous sedation, for 24 hours or while taking prescription Narcotics:  · Limit your activities  · Do not drive and operate hazardous machinery  · Do not make important personal or business decisions  · Do  not drink alcoholic beverages  · If you have not urinated within 8 hours after discharge, please contact your surgeon on call. *  Please give a list of your current medications to your Primary Care Provider. *  Please update this list whenever your medications are discontinued, doses are      changed, or new medications (including over-the-counter products) are added. *  Please carry medication information at all times in case of emergency situations. These are general instructions for a healthy lifestyle:    No smoking/ No tobacco products/ Avoid exposure to second hand smoke    Surgeon General's Warning:  Quitting smoking now greatly reduces serious risk to your health. Obesity, smoking, and sedentary lifestyle greatly increases your risk for illness    A healthy diet, regular physical exercise & weight monitoring are important for maintaining a healthy lifestyle    You may be retaining fluid if you have a history of heart failure or if you experience any of the following symptoms:  Weight gain of 3 pounds or more overnight or 5 pounds in a week, increased swelling in our hands or feet or shortness of breath while lying flat in bed. Please call your doctor as soon as you notice any of these symptoms; do not wait until your next office visit. Recognize signs and symptoms of STROKE:    F-face looks uneven    A-arms unable to move or move unevenly    S-speech slurred or non-existent    T-time-call 911 as soon as signs and symptoms begin-DO NOT go       Back to bed or wait to see if you get better-TIME IS BRAIN. Advance Care Planning  People with COVID-19 may have no symptoms, mild symptoms, such as fever, cough, and shortness of breath or they may have more severe illness, developing severe and fatal pneumonia.   As a result, Advance Care Planning with attention to naming a health care decision maker (someone you trust to make healthcare decisions for you if you could not speak for yourself) and sharing other health care preferences is important BEFORE a possible health crisis. Please contact your Primary Care Provider to discuss Advance Care Planning. Preventing the Spread of Coronavirus Disease 2019 in Homes and Residential Communities  For the most recent information go to [email or web address]    Prevention steps for People with confirmed or suspected COVID-19 (including persons under investigation) who do not need to be hospitalized  and   People with confirmed COVID-19 who were hospitalized and determined to be medically stable to go home    Your healthcare provider and public health staff will evaluate whether you can be cared for at home. If it is determined that you do not need to be hospitalized and can be isolated at home, you will be monitored by staff from your local or state health department. You should follow the prevention steps below until a healthcare provider or local or state health department says you can return to your normal activities. Stay home except to get medical care  People who are mildly ill with COVID-19 are able to isolate at home during their illness. You should restrict activities outside your home, except for getting medical care. Do not go to work, school, or public areas. Avoid using public transportation, ride-sharing, or taxis. Separate yourself from other people and animals in your home  People: As much as possible, you should stay in a specific room and away from other people in your home. Also, you should use a separate bathroom, if available. Animals: You should restrict contact with pets and other animals while you are sick with COVID-19, just like you would around other people.  Although there have not been reports of pets or other animals becoming sick with COVID-19, it is still recommended that people sick with COVID-19 limit contact with animals until more information is known about the virus. When possible, have another member of your household care for your animals while you are sick. If you are sick with COVID-19, avoid contact with your pet, including petting, snuggling, being kissed or licked, and sharing food. If you must care for your pet or be around animals while you are sick, wash your hands before and after you interact with pets and wear a facemask. Call ahead before visiting your doctor  If you have a medical appointment, call the healthcare provider and tell them that you have or may have COVID-19. This will help the healthcare providers office take steps to keep other people from getting infected or exposed. Wear a facemask  You should wear a facemask when you are around other people (e.g., sharing a room or vehicle) or pets and before you enter a healthcare providers office. If you are not able to wear a facemask (for example, because it causes trouble breathing), then people who live with you should not stay in the same room with you, or they should wear a facemask if they enter your room. Cover your coughs and sneezes  Cover your mouth and nose with a tissue when you cough or sneeze. Throw used tissues in a lined trash can. Immediately wash your hands with soap and water for at least 20 seconds or, if soap and water are not available, clean your hands with an alcohol-based hand  that contains at least 60% alcohol. Clean your hands often  Wash your hands often with soap and water for at least 20 seconds, especially after blowing your nose, coughing, or sneezing; going to the bathroom; and before eating or preparing food. If soap and water are not readily available, use an alcohol-based hand  with at least 60% alcohol, covering all surfaces of your hands and rubbing them together until they feel dry.   Soap and water are the best option if hands are visibly dirty. Avoid touching your eyes, nose, and mouth with unwashed hands. Avoid sharing personal household items  You should not share dishes, drinking glasses, cups, eating utensils, towels, or bedding with other people or pets in your home. After using these items, they should be washed thoroughly with soap and water. Clean all high-touch surfaces everyday  High touch surfaces include counters, tabletops, doorknobs, bathroom fixtures, toilets, phones, keyboards, tablets, and bedside tables. Also, clean any surfaces that may have blood, stool, or body fluids on them. Use a household cleaning spray or wipe, according to the label instructions. Labels contain instructions for safe and effective use of the cleaning product including precautions you should take when applying the product, such as wearing gloves and making sure you have good ventilation during use of the product. Monitor your symptoms  Seek prompt medical attention if your illness is worsening (e.g., difficulty breathing). Before seeking care, call your healthcare provider and tell them that you have, or are being evaluated for, COVID-19. Put on a facemask before you enter the facility. These steps will help the healthcare providers office to keep other people in the office or waiting room from getting infected or exposed. Ask your healthcare provider to call the local or state health department. Persons who are placed under active monitoring or facilitated self-monitoring should follow instructions provided by their local health department or occupational health professionals, as appropriate. When working with your local health department check their available hours. If you have a medical emergency and need to call 911, notify the dispatch personnel that you have, or are being evaluated for COVID-19. If possible, put on a facemask before emergency medical services arrive.   Discontinuing home isolation  Patients with confirmed COVID-19 should remain under home isolation precautions until the risk of secondary transmission to others is thought to be low. The decision to discontinue home isolation precautions should be made on a case-by-case basis, in consultation with healthcare providers and state and local health departments.

## 2020-06-18 NOTE — ANESTHESIA POSTPROCEDURE EVALUATION
Procedure(s):  RIGHT WRIST HARDWARE REMOVAL  RIGHT SHOULDER MANIPULATION UNDER ANESTHESIA.    general    Anesthesia Post Evaluation      Multimodal analgesia: multimodal analgesia used between 6 hours prior to anesthesia start to PACU discharge  Patient location during evaluation: PACU  Patient participation: complete - patient participated  Level of consciousness: awake and alert  Pain management: adequate  Airway patency: patent  Anesthetic complications: no  Cardiovascular status: acceptable  Respiratory status: acceptable  Hydration status: acceptable  Post anesthesia nausea and vomiting:  none      INITIAL Post-op Vital signs:   Vitals Value Taken Time   /58 6/18/2020  1:00 PM   Temp 36.4 °C (97.5 °F) 6/18/2020 12:21 PM   Pulse 66 6/18/2020  1:03 PM   Resp 16 6/18/2020  1:00 PM   SpO2 92 % 6/18/2020  1:03 PM   Vitals shown include unvalidated device data.

## 2020-06-18 NOTE — ANESTHESIA PROCEDURE NOTES
Peripheral Block    Start time: 6/18/2020 11:14 AM  End time: 6/18/2020 11:19 AM  Performed by: Lowell Estrada MD  Authorized by: Lowell Estrada MD       Pre-procedure: Indications: at surgeon's request and post-op pain management    Preanesthetic Checklist: patient identified, risks and benefits discussed, site marked, timeout performed, anesthesia consent given and patient being monitored    Timeout Time: 11:14          Block Type:   Block Type:   Interscalene  Laterality:  Right  Monitoring:  Continuous pulse ox, frequent vital sign checks, heart rate, responsive to questions and oxygen  Injection Technique:  Single shot  Procedures: ultrasound guided    Patient Position: supine (head elevated 45 degrees)  Prep: chlorhexidine    Location:  Interscalene  Needle Type:  Stimuplex  Needle Gauge:  20 G  Needle Localization:  Ultrasound guidance    Assessment:  Number of attempts:  1  Injection Assessment:  Incremental injection every 5 mL, local visualized surrounding nerve on ultrasound, negative aspiration for blood, no paresthesia, ultrasound image on chart and no intravascular symptoms  Patient tolerance:  Patient tolerated the procedure well with no immediate complications

## 2020-06-18 NOTE — OP NOTES
ORTHOPAEDIC SURGICAL NOTE        Yehuda Butler female 62 y.o.  [id number]   6/18/2020     PRE-OP DIAGNOSIS: Other extraarticular fracture of lower end of right radius, subsequent encounter for closed fracture with routine healing [S52.551D], Right shoulder stiffness  POST-OP DIAGNOSIS: Other extraarticular fracture of lower end of right radius, subsequent encounter for closed fracture with routine healing [S52.551D], Right shoulder stfifness  LATERALITY: Right     PROCEDURES PERFORMED:   Right shoulder manipulation under anesthesia, right distal radius hardware removal(46177, [id number])       SURGEON:   Aury Mario MD, PhD     IMPLANTS:   * No implants in log *   Procedure(s):  RIGHT WRIST HARDWARE REMOVAL  RIGHT SHOULDER MANIPULATION UNDER ANESTHESIA   Surgeon(s):  Hany Chester MD   Procedure(s):  RIGHT WRIST HARDWARE REMOVAL  RIGHT SHOULDER MANIPULATION UNDER ANESTHESIA     ANESTHESIA: General     STAFF:    Circ-1: Royal Roldan RN  Radiology Technician: Nova Ferreira  Scrub Tech-1: Prabhjot Nettles     ESTIMATED BLOOD LOSS: Minimal       TOTAL IV FLUIDS : See anesthesia note    COMPLICATIONS: None     DRAINS:       TOURNIQUET TIME:   Total Tourniquet Time Documented:  Upper Arm (Right) - 20 minutes  Total: Upper Arm (Right) - 20 minutes       INDICATION FOR PROCEDURE:     Yehuda Butler has right shoulder stiffness after surgery with Dr. Cory Noriega, he requested that I perform the procedure above to improve her motion since the patient was already scheduled to have plate removal in the wrist due to painful hardware. Surgical and non-surgical treatment options were discussed with the patient and their family, as well as the risk and benefits of each option. After thorough discussion, the patient decided to proceed with surgical management.   Specific to this treatment plan, we discussed in detail surgical risks including scar, pain, bleeding, infection, anesthetic risks, neurovascular injury, failure to achieve desired results, hardware problems, need for further surgery,  weakness, stiffness, risk of death and potential risk of other unforseen complication. The patient consented to the procedure after discussion of the risks and benefits. DESCRIPTION OF PROCEDURE:     The patient was identified in the holding room. The Right was marked and confirmed as the correct operative site. They were then brought to the OR and general anesthesia was induced. They were transferred onto the OR table in the supine position. All bony prominences were well padded. SCDs were placed on the bilateral legs throughout the case. A timeout was performed, verifying the correct patient, the correct side being the Right side and the correct procedure. Antibiotics were then administered, and were redosed during the procedure as needed at indicated intervals. A non-sterile tourniquet was placed on the Right arm. The Right upper extremity was pre scrubbed and then prepped and draped in routine sterile fashion. An incisional timeout was performed re-confirming the correct patient, surgical site and procedure, as well as verifying antibiotics. The right shoulder was examined carefully before manipulation, she had forward elevation of 110 degrees and external rotation of is 10 degrees, while applying stabilization force to the scapula caudally, the forearm was elevated in line with body, significant crepitus was felt in the joint and I was able to forward elevate the shoulder to 170 degrees, gentle external rotation force was then applied and I was able to perform external rotation to 90 degrees.     Attention was brought to the wrist, the previous FCR incision was used, blunt dissection was carried down to the FCR, the FCR sheath was then incised, manual dissection of the FPL was done in order lifted off the distal radius, the plate was identified and sharp  dissection was done with a knife in order to all the screws and pegs were removed without problem, remove all the scar tissue on top of the plate. Earnest Linea was used to elevate the plate from the bone, this was done without significant problem. Final fluoroscopy demonstrated all hardware was removed and fracture was healed. And    The tourniquet was deflated and hemostasis was ensured. The wounds were then thoroughly irrigated and closed in layered fashion with 4-0 Vicryl and 4-0 Stratafix. A sterile dressing was applied followed by a  compressive dressing     The patient was awakened and taken to PACU in stable condition. All sponge and needle counts were correct at the end of the case. I was present and scrubbed for the entire procedure. DISPOSITION:    The patient will be discharged home. Weightbearing status: Limit lifting to 2 lbs for the next 2 weeks       CHEMICAL VTE (DVT) PROPHYLAXIS:  None warranted for this case     FOLLOW-UP:  10-14 days for wound check and XRs if needed.      Nichole Ngo MD    06/18/20  2:43 PM

## 2020-06-18 NOTE — ANESTHESIA PREPROCEDURE EVALUATION
Relevant Problems   No relevant active problems       Anesthetic History     PONV          Review of Systems / Medical History  Patient summary reviewed and pertinent labs reviewed    Pulmonary  Within defined limits                 Neuro/Psych   Within defined limits           Cardiovascular              Hyperlipidemia    Exercise tolerance: >4 METS  Comments: Denies recent CP, SOB or Palpitations   GI/Hepatic/Renal     GERD: well controlled           Endo/Other      Hypothyroidism: well controlled       Other Findings              Physical Exam    Airway  Mallampati: I  TM Distance: 4 - 6 cm  Neck ROM: normal range of motion   Mouth opening: Normal     Cardiovascular    Rhythm: regular  Rate: normal         Dental  No notable dental hx       Pulmonary  Breath sounds clear to auscultation               Abdominal  GI exam deferred       Other Findings            Anesthetic Plan    ASA: 2  Anesthesia type: general - interscalene block      Post-op pain plan if not by surgeon: peripheral nerve block single    Induction: Intravenous  Anesthetic plan and risks discussed with: Patient and Spouse      Discussed GA with LMA, will attempt to avoid narcotics for severe PONV with last shoulder surgery

## 2020-06-18 NOTE — H&P
H&P Update:  Theo Lennox was seen and examined. History and physical has been reviewed. The patient has been examined.  There have been no significant clinical changes since the completion of the originally dated History and Mario Narvaez MD, PhD  Orthopaedic Surgery  06/18/20  11:18 AM

## 2022-03-18 PROBLEM — M19.011 DEGENERATIVE JOINT DISEASE OF RIGHT ACROMIOCLAVICULAR JOINT: Status: ACTIVE | Noted: 2020-02-26

## 2022-03-18 PROBLEM — S46.111A STRAIN OF MUSCLE, FASCIA AND TENDON OF LONG HEAD OF BICEPS, RIGHT ARM, INITIAL ENCOUNTER: Status: ACTIVE | Noted: 2020-02-26

## 2022-03-19 PROBLEM — S43.431A SUPERIOR GLENOID LABRUM LESION OF RIGHT SHOULDER: Status: ACTIVE | Noted: 2020-02-26

## 2022-03-19 PROBLEM — S62.101A CLOSED FRACTURE OF RIGHT WRIST: Status: ACTIVE | Noted: 2020-01-10

## 2022-03-19 PROBLEM — S46.011A TRAUMATIC TEAR OF RIGHT ROTATOR CUFF: Status: ACTIVE | Noted: 2020-02-26

## 2022-03-19 PROBLEM — E55.9 VITAMIN D DEFICIENCY: Status: ACTIVE | Noted: 2020-01-10

## 2022-03-20 PROBLEM — M75.01 ADHESIVE CAPSULITIS OF RIGHT SHOULDER: Status: ACTIVE | Noted: 2020-02-26

## 2022-08-24 ENCOUNTER — HOSPITAL ENCOUNTER (OUTPATIENT)
Dept: INFUSION THERAPY | Age: 67
Discharge: HOME | End: 2022-08-24
Payer: MEDICARE

## 2022-08-24 ENCOUNTER — HOSPITAL ENCOUNTER (OUTPATIENT)
Dept: LAB | Age: 67
Discharge: HOME | End: 2022-08-27

## 2022-08-24 VITALS
OXYGEN SATURATION: 98 % | SYSTOLIC BLOOD PRESSURE: 127 MMHG | RESPIRATION RATE: 18 BRPM | HEART RATE: 65 BPM | DIASTOLIC BLOOD PRESSURE: 73 MMHG | TEMPERATURE: 98.5 F

## 2022-08-24 LAB
CALCIUM SERPL-MCNC: 9.4 MG/DL (ref 8.3–10.4)
CREAT SERPL-MCNC: 0.9 MG/DL (ref 0.6–1)

## 2022-08-24 PROCEDURE — 82565 ASSAY OF CREATININE: CPT

## 2022-08-24 PROCEDURE — 96372 THER/PROPH/DIAG INJ SC/IM: CPT

## 2022-08-24 PROCEDURE — 6360000002 HC RX W HCPCS: Performed by: INTERNAL MEDICINE

## 2022-08-24 PROCEDURE — 82310 ASSAY OF CALCIUM: CPT

## 2022-08-24 PROCEDURE — 36415 COLL VENOUS BLD VENIPUNCTURE: CPT

## 2022-08-24 RX ADMIN — DENOSUMAB 60 MG: 60 INJECTION SUBCUTANEOUS at 11:12

## 2022-08-24 NOTE — PROGRESS NOTES
Arrived to the [facility]. Prolia injection completed. Provided education on Prolia. Patient instructed to report any side affects to ordering provider. Patient tolerated well. Any issues or concerns during appointment: no.  Patient to call to arrange next infusion appointment. Discharged ambulatory with self.

## 2023-05-24 PROBLEM — M81.0 AGE RELATED OSTEOPOROSIS: Status: ACTIVE | Noted: 2023-05-24

## 2023-05-24 RX ORDER — ACETAMINOPHEN 325 MG/1
650 TABLET ORAL
Status: CANCELLED | OUTPATIENT
Start: 2023-05-25

## 2023-05-24 RX ORDER — DIPHENHYDRAMINE HYDROCHLORIDE 50 MG/ML
50 INJECTION INTRAMUSCULAR; INTRAVENOUS
Status: CANCELLED | OUTPATIENT
Start: 2023-05-25

## 2023-05-24 RX ORDER — ONDANSETRON 2 MG/ML
8 INJECTION INTRAMUSCULAR; INTRAVENOUS
Status: CANCELLED | OUTPATIENT
Start: 2023-05-25

## 2023-05-24 RX ORDER — EPINEPHRINE 1 MG/ML
0.3 INJECTION, SOLUTION, CONCENTRATE INTRAVENOUS PRN
Status: CANCELLED | OUTPATIENT
Start: 2023-05-25

## 2023-05-24 RX ORDER — SODIUM CHLORIDE 9 MG/ML
INJECTION, SOLUTION INTRAVENOUS CONTINUOUS
Status: CANCELLED | OUTPATIENT
Start: 2023-05-25

## 2023-05-24 RX ORDER — ALBUTEROL SULFATE 90 UG/1
4 AEROSOL, METERED RESPIRATORY (INHALATION) PRN
Status: CANCELLED | OUTPATIENT
Start: 2023-05-25

## 2023-05-25 ENCOUNTER — HOSPITAL ENCOUNTER (OUTPATIENT)
Dept: INFUSION THERAPY | Age: 68
Discharge: HOME | End: 2023-05-25
Payer: MEDICARE

## 2023-05-25 VITALS
OXYGEN SATURATION: 98 % | TEMPERATURE: 97.8 F | HEART RATE: 71 BPM | DIASTOLIC BLOOD PRESSURE: 59 MMHG | SYSTOLIC BLOOD PRESSURE: 120 MMHG | RESPIRATION RATE: 18 BRPM

## 2023-05-25 DIAGNOSIS — M81.0 AGE RELATED OSTEOPOROSIS, UNSPECIFIED PATHOLOGICAL FRACTURE PRESENCE: Primary | ICD-10-CM

## 2023-05-25 PROCEDURE — 6360000002 HC RX W HCPCS: Performed by: INTERNAL MEDICINE

## 2023-05-25 PROCEDURE — 96372 THER/PROPH/DIAG INJ SC/IM: CPT

## 2023-05-25 RX ORDER — DIPHENHYDRAMINE HYDROCHLORIDE 50 MG/ML
50 INJECTION INTRAMUSCULAR; INTRAVENOUS
OUTPATIENT
Start: 2023-11-23

## 2023-05-25 RX ORDER — ONDANSETRON 2 MG/ML
8 INJECTION INTRAMUSCULAR; INTRAVENOUS
OUTPATIENT
Start: 2023-11-23

## 2023-05-25 RX ORDER — EPINEPHRINE 1 MG/ML
0.3 INJECTION, SOLUTION, CONCENTRATE INTRAVENOUS PRN
OUTPATIENT
Start: 2023-11-23

## 2023-05-25 RX ORDER — SODIUM CHLORIDE 9 MG/ML
INJECTION, SOLUTION INTRAVENOUS CONTINUOUS
OUTPATIENT
Start: 2023-11-23

## 2023-05-25 RX ORDER — ACETAMINOPHEN 325 MG/1
650 TABLET ORAL
OUTPATIENT
Start: 2023-11-23

## 2023-05-25 RX ORDER — ALBUTEROL SULFATE 90 UG/1
4 AEROSOL, METERED RESPIRATORY (INHALATION) PRN
OUTPATIENT
Start: 2023-11-23

## 2023-05-25 RX ADMIN — DENOSUMAB 60 MG: 60 INJECTION SUBCUTANEOUS at 09:19

## 2023-05-25 NOTE — PROGRESS NOTES
Arrived to the [facility]. Prolia completed. Patient tolerated well. Any issues or concerns during appointment: none. Patient will call to schedule next appt. Discharged ambulatory.
ambulatory
Xolair Pregnancy And Lactation Text: This medication is Pregnancy Category B and is considered safe during pregnancy. This medication is excreted in breast milk.

## 2023-07-12 ENCOUNTER — OFFICE VISIT (OUTPATIENT)
Dept: ORTHOPEDIC SURGERY | Age: 68
End: 2023-07-12
Payer: MEDICARE

## 2023-07-12 DIAGNOSIS — G89.29 CHRONIC PAIN OF RIGHT THUMB: Primary | ICD-10-CM

## 2023-07-12 DIAGNOSIS — M79.644 CHRONIC PAIN OF RIGHT THUMB: Primary | ICD-10-CM

## 2023-07-12 PROCEDURE — 1090F PRES/ABSN URINE INCON ASSESS: CPT | Performed by: ORTHOPAEDIC SURGERY

## 2023-07-12 PROCEDURE — G8399 PT W/DXA RESULTS DOCUMENT: HCPCS | Performed by: ORTHOPAEDIC SURGERY

## 2023-07-12 PROCEDURE — 3017F COLORECTAL CA SCREEN DOC REV: CPT | Performed by: ORTHOPAEDIC SURGERY

## 2023-07-12 PROCEDURE — G8428 CUR MEDS NOT DOCUMENT: HCPCS | Performed by: ORTHOPAEDIC SURGERY

## 2023-07-12 PROCEDURE — 99213 OFFICE O/P EST LOW 20 MIN: CPT | Performed by: ORTHOPAEDIC SURGERY

## 2023-07-12 PROCEDURE — 1036F TOBACCO NON-USER: CPT | Performed by: ORTHOPAEDIC SURGERY

## 2023-07-12 PROCEDURE — G8421 BMI NOT CALCULATED: HCPCS | Performed by: ORTHOPAEDIC SURGERY

## 2023-07-12 PROCEDURE — 1123F ACP DISCUSS/DSCN MKR DOCD: CPT | Performed by: ORTHOPAEDIC SURGERY

## 2023-07-12 NOTE — PROGRESS NOTES
Patient was prescribed a CMC splint and against medical advice the patient declined to receive/purchase the DME. Patient understands they may take their prescription elsewhere if desired. The patient was going to look on [address] for something cheaper, and she will come back if she decides she wants this one.

## 2023-07-12 NOTE — PROGRESS NOTES
Orthopaedic Hand Clinic Note    Name: Aisha Daniel  Age: 78 y.o. YOB: 1955  Gender: female  MRN: [id number]      Follow up visit:   1. Chronic pain of right thumb        HPI: Aisha Daniel is a 78 y.o. female who is following up for an unrelated issue, she is status post right wrist volar plate removal 3 years ago, she did well with that, she reports slowly worsening pain on the volar radial aspect of the wrist as well as the right shoulder, she thinks some of it may be from overuse. ROS/Meds/PSH/PMH/FH/SH: I personally reviewed the patients standard intake form. Pertinents are discussed in the HPI    Physical Examination:  General: Awake and alert. HEENT: Normocephalic, atraumatic  CV/Pulm: Breathing even and unlabored  Skin: No obvious rashes noted. Lymphatic: No obvious evidence of lymphedema or lymphadenopathy    Musculoskeletal Examination:  Examination on the right upper extremity demonstrates cap refill < 5 seconds in all fingers, tenderness palpation of the STT joint as well as the FCR sheath at the scaphoid tubercle, mild tenderness palpation of the [facility] joint, very minimal at the radiocarpal joint, very minimal at the radial styloid. Examination of the right shoulder demonstrates 150 degrees of forward elevation, 140 degrees of abduction without pain, negative Neer, negative Ami, positive Conde, equivocal Speed. Imaging / Electrodiagnostic Tests:     none    Assessment:   1. Chronic pain of right thumb        Plan:   We discussed the diagnosis and different treatment options. We discussed observation, therapy, antiinflammatory medications and other pertinent treatment modalities.     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

## 2023-12-20 DIAGNOSIS — M81.0 AGE RELATED OSTEOPOROSIS, UNSPECIFIED PATHOLOGICAL FRACTURE PRESENCE: Primary | ICD-10-CM

## 2024-01-10 DIAGNOSIS — M81.0 AGE RELATED OSTEOPOROSIS, UNSPECIFIED PATHOLOGICAL FRACTURE PRESENCE: ICD-10-CM

## 2024-01-10 LAB
ALBUMIN SERPL-MCNC: 3.9 G/DL (ref 3.2–4.6)
ALBUMIN/GLOB SERPL: 1.3 (ref 0.4–1.6)
ALP SERPL-CCNC: 76 U/L (ref 50–136)
ALT SERPL-CCNC: 25 U/L (ref 12–65)
ANION GAP SERPL CALC-SCNC: 4 MMOL/L (ref 2–11)
AST SERPL-CCNC: 18 U/L (ref 15–37)
BILIRUB SERPL-MCNC: 0.4 MG/DL (ref 0.2–1.1)
BUN SERPL-MCNC: 12 MG/DL (ref 8–23)
CALCIUM SERPL-MCNC: 9.6 MG/DL (ref 8.3–10.4)
CHLORIDE SERPL-SCNC: 103 MMOL/L (ref 103–113)
CO2 SERPL-SCNC: 32 MMOL/L (ref 21–32)
CREAT SERPL-MCNC: 0.9 MG/DL (ref 0.6–1)
GLOBULIN SER CALC-MCNC: 3 G/DL (ref 2.8–4.5)
GLUCOSE SERPL-MCNC: 86 MG/DL (ref 65–100)
PHOSPHATE SERPL-MCNC: 3 MG/DL (ref 2.3–3.7)
POTASSIUM SERPL-SCNC: 3.7 MMOL/L (ref 3.5–5.1)
PROT SERPL-MCNC: 6.9 G/DL (ref 6.3–8.2)
SODIUM SERPL-SCNC: 139 MMOL/L (ref 136–146)

## 2024-01-11 ENCOUNTER — NURSE ONLY (OUTPATIENT)
Age: 69
End: 2024-01-11
Payer: MEDICARE

## 2024-01-11 VITALS
DIASTOLIC BLOOD PRESSURE: 79 MMHG | HEART RATE: 77 BPM | OXYGEN SATURATION: 99 % | SYSTOLIC BLOOD PRESSURE: 128 MMHG | TEMPERATURE: 96.8 F | RESPIRATION RATE: 16 BRPM

## 2024-01-11 DIAGNOSIS — M81.0 AGE RELATED OSTEOPOROSIS, UNSPECIFIED PATHOLOGICAL FRACTURE PRESENCE: Primary | ICD-10-CM

## 2024-01-11 PROCEDURE — 96372 THER/PROPH/DIAG INJ SC/IM: CPT | Performed by: PSYCHIATRY & NEUROLOGY

## 2024-01-11 RX ORDER — ALBUTEROL SULFATE 90 UG/1
4 AEROSOL, METERED RESPIRATORY (INHALATION) PRN
OUTPATIENT
Start: 2024-05-23

## 2024-01-11 RX ORDER — ACETAMINOPHEN 325 MG/1
650 TABLET ORAL
OUTPATIENT
Start: 2024-05-23

## 2024-01-11 RX ORDER — SODIUM CHLORIDE 9 MG/ML
INJECTION, SOLUTION INTRAVENOUS CONTINUOUS
OUTPATIENT
Start: 2024-05-23

## 2024-01-11 RX ORDER — ONDANSETRON 2 MG/ML
8 INJECTION INTRAMUSCULAR; INTRAVENOUS
OUTPATIENT
Start: 2024-05-23

## 2024-01-11 RX ORDER — EPINEPHRINE 1 MG/ML
0.3 INJECTION, SOLUTION, CONCENTRATE INTRAVENOUS PRN
OUTPATIENT
Start: 2024-05-23

## 2024-01-11 RX ORDER — DIPHENHYDRAMINE HYDROCHLORIDE 50 MG/ML
50 INJECTION INTRAMUSCULAR; INTRAVENOUS
OUTPATIENT
Start: 2024-05-23

## 2024-01-11 NOTE — PROGRESS NOTES
[facility]  [address], [address] [place] [address]  Office : [phone], Fax: [phone]       Osteoporosis pre-infusion/injection questionnaire:    1. In the past few months or in the few months, have you had or are you planning to have any dental surgery or major dental procedures?  no    2. Are you taking a calcium and vitamin D supplement? yes    3. When was your last osteoporosis treatment? Over 6 months ago    4. In the last year, have you had any fractures? no        Pt ambulatory to infusion suite with repeat Prolia subcutaneous injection.   Pertinent labs drawn 1/10/2024 reviewed and are within medication administration parameters.   Prolia 60mg/ml injected SC into left arm. Patient tolerated injection well.  Pt observed for 30 minutes post-injection without complications.   Advised patient to continue with calcium and vitamin D supplements post injection. Advised patient to call the ordering provider with any problems post injection.      Next Prolia appt scheduled with pt for 6 months from now.     Pt ambulatory with steady gait out of infusion suite.

## 2024-06-07 ENCOUNTER — TELEPHONE (OUTPATIENT)
Dept: NEUROLOGY | Age: 69
End: 2024-06-07

## 2024-06-07 NOTE — TELEPHONE ENCOUNTER
Pt lvm on the rx line stating that she had some questions about her lab work before her appointment on July 11, this is concerning her Prolia injection.

## 2024-07-01 DIAGNOSIS — M81.0 AGE RELATED OSTEOPOROSIS, UNSPECIFIED PATHOLOGICAL FRACTURE PRESENCE: ICD-10-CM

## 2024-07-01 LAB
ALBUMIN SERPL-MCNC: 4.4 G/DL (ref 3.2–4.6)
ALBUMIN/GLOB SERPL: 1.4 (ref 1–1.9)
ALP SERPL-CCNC: 76 U/L (ref 35–104)
ALT SERPL-CCNC: 15 U/L (ref 12–65)
ANION GAP SERPL CALC-SCNC: 11 MMOL/L (ref 9–18)
AST SERPL-CCNC: 24 U/L (ref 15–37)
BILIRUB SERPL-MCNC: 0.4 MG/DL (ref 0–1.2)
BUN SERPL-MCNC: 13 MG/DL (ref 8–23)
CALCIUM SERPL-MCNC: 10.4 MG/DL (ref 8.8–10.2)
CHLORIDE SERPL-SCNC: 100 MMOL/L (ref 98–107)
CO2 SERPL-SCNC: 29 MMOL/L (ref 20–28)
CREAT SERPL-MCNC: 0.97 MG/DL (ref 0.6–1.1)
GLOBULIN SER CALC-MCNC: 3.2 G/DL (ref 2.3–3.5)
GLUCOSE SERPL-MCNC: 119 MG/DL (ref 70–99)
PHOSPHATE SERPL-MCNC: 3.9 MG/DL (ref 2.5–4.5)
POTASSIUM SERPL-SCNC: 3.9 MMOL/L (ref 3.5–5.1)
PROT SERPL-MCNC: 7.6 G/DL (ref 6.3–8.2)
SODIUM SERPL-SCNC: 140 MMOL/L (ref 136–145)

## 2024-07-11 ENCOUNTER — NURSE ONLY (OUTPATIENT)
Age: 69
End: 2024-07-11

## 2024-07-11 VITALS
TEMPERATURE: 97.9 F | OXYGEN SATURATION: 99 % | HEART RATE: 62 BPM | RESPIRATION RATE: 18 BRPM | DIASTOLIC BLOOD PRESSURE: 78 MMHG | SYSTOLIC BLOOD PRESSURE: 139 MMHG

## 2024-07-11 DIAGNOSIS — M81.0 AGE RELATED OSTEOPOROSIS, UNSPECIFIED PATHOLOGICAL FRACTURE PRESENCE: Primary | ICD-10-CM

## 2024-07-11 RX ORDER — ACETAMINOPHEN 325 MG/1
650 TABLET ORAL
Status: CANCELLED | OUTPATIENT
Start: 2024-07-11

## 2024-07-11 RX ORDER — ACETAMINOPHEN 325 MG/1
650 TABLET ORAL
Status: CANCELLED | OUTPATIENT
Start: 2025-01-09

## 2024-07-11 RX ORDER — ONDANSETRON 2 MG/ML
8 INJECTION INTRAMUSCULAR; INTRAVENOUS
Status: CANCELLED | OUTPATIENT
Start: 2024-07-11

## 2024-07-11 RX ORDER — SODIUM CHLORIDE 9 MG/ML
INJECTION, SOLUTION INTRAVENOUS CONTINUOUS
Status: CANCELLED | OUTPATIENT
Start: 2025-01-09

## 2024-07-11 RX ORDER — SODIUM CHLORIDE 9 MG/ML
INJECTION, SOLUTION INTRAVENOUS CONTINUOUS
Status: CANCELLED | OUTPATIENT
Start: 2024-07-11

## 2024-07-11 RX ORDER — ONDANSETRON 2 MG/ML
8 INJECTION INTRAMUSCULAR; INTRAVENOUS
Status: CANCELLED | OUTPATIENT
Start: 2025-01-09

## 2024-07-11 RX ORDER — EPINEPHRINE 1 MG/ML
0.3 INJECTION, SOLUTION, CONCENTRATE INTRAVENOUS PRN
Status: CANCELLED | OUTPATIENT
Start: 2024-07-11

## 2024-07-11 RX ORDER — ALBUTEROL SULFATE 90 UG/1
4 AEROSOL, METERED RESPIRATORY (INHALATION) PRN
Status: CANCELLED | OUTPATIENT
Start: 2024-07-11

## 2024-07-11 RX ORDER — EPINEPHRINE 1 MG/ML
0.3 INJECTION, SOLUTION, CONCENTRATE INTRAVENOUS PRN
Status: CANCELLED | OUTPATIENT
Start: 2025-01-09

## 2024-07-11 RX ORDER — ALBUTEROL SULFATE 90 UG/1
4 AEROSOL, METERED RESPIRATORY (INHALATION) PRN
Status: CANCELLED | OUTPATIENT
Start: 2025-01-09

## 2024-07-11 RX ORDER — DIPHENHYDRAMINE HYDROCHLORIDE 50 MG/ML
50 INJECTION INTRAMUSCULAR; INTRAVENOUS
Status: CANCELLED | OUTPATIENT
Start: 2025-01-09

## 2024-07-11 RX ORDER — DIPHENHYDRAMINE HYDROCHLORIDE 50 MG/ML
50 INJECTION INTRAMUSCULAR; INTRAVENOUS
Status: CANCELLED | OUTPATIENT
Start: 2024-07-11

## 2024-07-12 NOTE — PROGRESS NOTES
[facility]  [address], [address] [place] [address]  Office : [phone], Fax: [phone]         Osteoporosis pre-infusion/injection questionnaire:     1. In the past month, have you had or are you planning to have any dental surgery or major dental procedures?  No     2. Are you taking a calcium and vitamin D supplement? Yes     3. When was your last osteoporosis treatment?  05/24/2023     4. In the last year, have you had any fractures? No    Prolia would not scan. Also, Epic went down during patient visit and the treatment plan kept auto populating future visits.    NDC: 34172-696-34  Lot: [id number]  Exp: 10/31/2026       Patient arrived ambulatory to infusion suite today for a repeat / initial Prolia subcutaneous injection.  Pertinent labs drawn 07/01/2024 . Labs reviewed and are within medication administration parameters.  Prolia 60mg/ml injected SC into left arm. Patient tolerated injection well.       Advised patient to continue with calcium and vitamin D supplements post injection. Advised patient to call the ordering provider with any problems post injection.       Next Prolia appt scheduled prior to departure from infusion suite.     Pt ambulatory with steady gait out of infusion suite.

## 2024-09-23 ENCOUNTER — OFFICE VISIT (OUTPATIENT)
Dept: ORTHOPEDIC SURGERY | Age: 69
End: 2024-09-23
Payer: MEDICARE

## 2024-09-23 DIAGNOSIS — M79.671 RIGHT FOOT PAIN: Primary | ICD-10-CM

## 2024-09-23 DIAGNOSIS — M72.2 PLANTAR FASCIITIS, RIGHT: ICD-10-CM

## 2024-09-23 PROCEDURE — G8399 PT W/DXA RESULTS DOCUMENT: HCPCS | Performed by: ORTHOPAEDIC SURGERY

## 2024-09-23 PROCEDURE — G8421 BMI NOT CALCULATED: HCPCS | Performed by: ORTHOPAEDIC SURGERY

## 2024-09-23 PROCEDURE — 1036F TOBACCO NON-USER: CPT | Performed by: ORTHOPAEDIC SURGERY

## 2024-09-23 PROCEDURE — 3017F COLORECTAL CA SCREEN DOC REV: CPT | Performed by: ORTHOPAEDIC SURGERY

## 2024-09-23 PROCEDURE — 1123F ACP DISCUSS/DSCN MKR DOCD: CPT | Performed by: ORTHOPAEDIC SURGERY

## 2024-09-23 PROCEDURE — 99214 OFFICE O/P EST MOD 30 MIN: CPT | Performed by: ORTHOPAEDIC SURGERY

## 2024-09-23 PROCEDURE — 1090F PRES/ABSN URINE INCON ASSESS: CPT | Performed by: ORTHOPAEDIC SURGERY

## 2024-09-23 PROCEDURE — G8427 DOCREV CUR MEDS BY ELIG CLIN: HCPCS | Performed by: ORTHOPAEDIC SURGERY

## 2025-01-07 DIAGNOSIS — M81.0 AGE RELATED OSTEOPOROSIS, UNSPECIFIED PATHOLOGICAL FRACTURE PRESENCE: ICD-10-CM

## 2025-01-08 LAB
ALBUMIN SERPL-MCNC: 3.8 G/DL (ref 3.2–4.6)
ALBUMIN/GLOB SERPL: 1.1 (ref 1–1.9)
ALP SERPL-CCNC: 78 U/L (ref 35–104)
ALT SERPL-CCNC: 17 U/L (ref 8–45)
ANION GAP SERPL CALC-SCNC: 14 MMOL/L (ref 7–16)
AST SERPL-CCNC: 25 U/L (ref 15–37)
BILIRUB SERPL-MCNC: 0.3 MG/DL (ref 0–1.2)
BUN SERPL-MCNC: 14 MG/DL (ref 8–23)
CALCIUM SERPL-MCNC: 9.9 MG/DL (ref 8.8–10.2)
CHLORIDE SERPL-SCNC: 99 MMOL/L (ref 98–107)
CO2 SERPL-SCNC: 24 MMOL/L (ref 20–29)
CREAT SERPL-MCNC: 0.94 MG/DL (ref 0.6–1.1)
GLOBULIN SER CALC-MCNC: 3.5 G/DL (ref 2.3–3.5)
GLUCOSE SERPL-MCNC: 198 MG/DL (ref 70–99)
POTASSIUM SERPL-SCNC: 3.8 MMOL/L (ref 3.5–5.1)
PROT SERPL-MCNC: 7.2 G/DL (ref 6.3–8.2)
SODIUM SERPL-SCNC: 137 MMOL/L (ref 136–145)

## 2025-01-13 DIAGNOSIS — M81.0 AGE RELATED OSTEOPOROSIS, UNSPECIFIED PATHOLOGICAL FRACTURE PRESENCE: Primary | ICD-10-CM

## 2025-01-13 RX ORDER — ALBUTEROL SULFATE 90 UG/1
4 INHALANT RESPIRATORY (INHALATION) PRN
OUTPATIENT
Start: 2025-01-13

## 2025-01-13 RX ORDER — ONDANSETRON 2 MG/ML
8 INJECTION INTRAMUSCULAR; INTRAVENOUS
OUTPATIENT
Start: 2025-01-13

## 2025-01-13 RX ORDER — DIPHENHYDRAMINE HYDROCHLORIDE 50 MG/ML
50 INJECTION INTRAMUSCULAR; INTRAVENOUS
OUTPATIENT
Start: 2025-01-13

## 2025-01-13 RX ORDER — SODIUM CHLORIDE 9 MG/ML
INJECTION, SOLUTION INTRAVENOUS CONTINUOUS
OUTPATIENT
Start: 2025-01-13

## 2025-01-13 RX ORDER — ACETAMINOPHEN 325 MG/1
650 TABLET ORAL
OUTPATIENT
Start: 2025-01-13

## 2025-01-13 RX ORDER — EPINEPHRINE 1 MG/ML
0.3 INJECTION, SOLUTION, CONCENTRATE INTRAVENOUS PRN
OUTPATIENT
Start: 2025-01-13

## 2025-01-13 RX ORDER — HYDROCORTISONE SODIUM SUCCINATE 100 MG/2ML
100 INJECTION INTRAMUSCULAR; INTRAVENOUS
OUTPATIENT
Start: 2025-01-13

## 2025-01-15 ENCOUNTER — NURSE ONLY (OUTPATIENT)
Age: 70
End: 2025-01-15
Payer: MEDICARE

## 2025-01-15 VITALS
HEART RATE: 67 BPM | SYSTOLIC BLOOD PRESSURE: 110 MMHG | DIASTOLIC BLOOD PRESSURE: 71 MMHG | OXYGEN SATURATION: 97 % | TEMPERATURE: 98.1 F | RESPIRATION RATE: 18 BRPM

## 2025-01-15 DIAGNOSIS — M81.0 AGE RELATED OSTEOPOROSIS, UNSPECIFIED PATHOLOGICAL FRACTURE PRESENCE: Primary | ICD-10-CM

## 2025-01-15 PROCEDURE — 96372 THER/PROPH/DIAG INJ SC/IM: CPT | Performed by: PSYCHIATRY & NEUROLOGY

## 2025-01-15 RX ORDER — SODIUM CHLORIDE 9 MG/ML
INJECTION, SOLUTION INTRAVENOUS CONTINUOUS
OUTPATIENT
Start: 2025-07-13

## 2025-01-15 RX ORDER — ALBUTEROL SULFATE 90 UG/1
4 INHALANT RESPIRATORY (INHALATION) PRN
OUTPATIENT
Start: 2025-07-13

## 2025-01-15 RX ORDER — HYDROCORTISONE SODIUM SUCCINATE 100 MG/2ML
100 INJECTION INTRAMUSCULAR; INTRAVENOUS
OUTPATIENT
Start: 2025-07-13

## 2025-01-15 RX ORDER — DIPHENHYDRAMINE HYDROCHLORIDE 50 MG/ML
50 INJECTION INTRAMUSCULAR; INTRAVENOUS
OUTPATIENT
Start: 2025-07-13

## 2025-01-15 RX ORDER — ONDANSETRON 2 MG/ML
8 INJECTION INTRAMUSCULAR; INTRAVENOUS
OUTPATIENT
Start: 2025-07-13

## 2025-01-15 RX ORDER — ACETAMINOPHEN 325 MG/1
650 TABLET ORAL
OUTPATIENT
Start: 2025-07-13

## 2025-01-15 RX ORDER — EPINEPHRINE 1 MG/ML
0.3 INJECTION, SOLUTION, CONCENTRATE INTRAVENOUS PRN
OUTPATIENT
Start: 2025-07-13

## 2025-01-15 NOTE — PROGRESS NOTES
[facility]  [address], [address] [place] [address]  Office : [phone], Fax: [phone]        Osteoporosis pre-infusion/injection questionnaire:     1. In the past month, have you had or are you planning to have any dental surgery or major dental procedures?  No     2. Are you taking a calcium and vitamin D supplement? Yes     3. When was your last osteoporosis treatment?  6 Months      4. In the last year, have you had any fractures? No        Patient arrived ambulatory to infusion suite today for a repeat Prolia subcutaneous injection.  Pertinent labs drawn 01/07/2025 . Labs reviewed and are within medication administration parameters.  Prolia 60mg/ml injected SC into left arm. Patient tolerated injection well.    Advised patient to continue with calcium and vitamin D supplements post injection. Advised patient to call the ordering provider with any problems post injection.       Next Prolia appt scheduled 07/2025 prior to departure from infusion suite.     Pt ambulatory with steady gait out of infusion suite.

## 2025-07-16 ENCOUNTER — CLINICAL SUPPORT (OUTPATIENT)
Age: 70
End: 2025-07-16

## 2025-07-16 VITALS
SYSTOLIC BLOOD PRESSURE: 117 MMHG | HEART RATE: 74 BPM | RESPIRATION RATE: 20 BRPM | DIASTOLIC BLOOD PRESSURE: 71 MMHG | TEMPERATURE: 97.5 F | OXYGEN SATURATION: 95 %

## 2025-07-16 DIAGNOSIS — M81.0 AGE RELATED OSTEOPOROSIS, UNSPECIFIED PATHOLOGICAL FRACTURE PRESENCE: Primary | ICD-10-CM

## 2025-07-16 NOTE — PROGRESS NOTES
[facility]  [address], [address] [place] [address]  Office : [phone], Fax: [phone]        Osteoporosis pre-infusion/injection questionnaire:     1. In the past month, have you had or are you planning to have any dental surgery or major dental procedures?  No     2. Are you taking a calcium and vitamin D supplement? Yes     3. When was your last osteoporosis treatment?  01/2025     4. In the last year, have you had any fractures? No        Patient arrived ambulatory to infusion suite today for a repeat Prolia subcutaneous injection.  Pertinent labs drawn 07/02/2025 . Labs reviewed and are within medication administration parameters.  Prolia 60mg/ml injected SC into left arm. Patient tolerated injection well.    Advised patient to continue with calcium and vitamin D supplements post injection. Advised patient to call the ordering provider with any problems post injection.       Next Prolia appt scheduled 01/2026 prior to departure from infusion suite.     Pt ambulatory with steady gait out of infusion suite.

## (undated) DEVICE — BNDG,ELSTC,MATRIX,STRL,3"X5YD,LF,HOOK&LP: Brand: MEDLINE

## (undated) DEVICE — DRAPE,HAND,STERILE: Brand: MEDLINE

## (undated) DEVICE — ZIMMER® STERILE DISPOSABLE TOURNIQUET CUFF WITH PLC, DUAL PORT, SINGLE BLADDER, 18 IN. (46 CM)

## (undated) DEVICE — DISPOSABLE BIPOLAR CODE, 12' (3.66 M): Brand: CONMED

## (undated) DEVICE — PUDDLEVAC FLOOR SUCTION DEVICE: Brand: PUDDLEVAC

## (undated) DEVICE — MASTISOL ADHESIVE LIQ 2/3ML

## (undated) DEVICE — SOL IRR SOD CL 0.9% 1000ML BTL --

## (undated) DEVICE — SUTURE PDS II SZ 3-0 L27IN ABSRB VLT L26MM SH 1/2 CIR Z316H

## (undated) DEVICE — (D)STRIP SKN CLSR 0.5X4IN WHT --

## (undated) DEVICE — K WIRE FIX L150MM DIA1.25MM S STL TRCR PNT
Type: IMPLANTABLE DEVICE | Site: ARM | Status: NON-FUNCTIONAL
Removed: 2019-12-31

## (undated) DEVICE — SUTURE PROL SZ 2-0 L18IN NONABSORBABLE BLU FS L26MM 3/8 CIR 8685H

## (undated) DEVICE — OUTFLOW CASSETTE TUBING, DO NOT USE IF PACKAGE IS DAMAGED: Brand: CROSSFLOW

## (undated) DEVICE — PACK,SHOULDER,DRAPE,POUCH: Brand: MEDLINE

## (undated) DEVICE — NEEDLE HYPO 25GA L1.5IN BLU POLYPR HUB S STL REG BVL STR

## (undated) DEVICE — (D)PREP SKN CHLRAPRP APPL 26ML -- CONVERT TO ITEM 371833

## (undated) DEVICE — SUTURE VCRL SZ 0 L27IN ABSRB UD L36MM CP-1 1/2 CIR REV CUT J267H

## (undated) DEVICE — SOLUTION IRRIG 3000ML 0.9% SOD CHL FLX CONT 0797208] ICU MEDICAL INC]

## (undated) DEVICE — SURGICAL PROCEDURE PACK BASIC ST FRANCIS

## (undated) DEVICE — AMD ANTIMICROBIAL GAUZE SPONGES,12 PLY USP TYPE VII, 0.2% POLYHEXAMETHYLENE BIGUANIDE HCI (PHMB): Brand: CURITY

## (undated) DEVICE — BNDG,ELSTC,MATRIX,STRL,2"X5YD,LF,HOOK&LP: Brand: MEDLINE

## (undated) DEVICE — 90-S, SUCTION PROBE, NON-BENDABLE, MAX CUT LEVEL 11: Brand: SERFAS ENERGY

## (undated) DEVICE — GARMENT,MEDLINE,DVT,INT,CALF,MED, GEN2: Brand: MEDLINE

## (undated) DEVICE — SLING ARM 2-37INX8-17IN PCH -- MED

## (undated) DEVICE — SPONGE GZ W4XL4IN COT 12 PLY TYP VII WVN C FLD DSGN

## (undated) DEVICE — SHOULDER ARTHRO DR POSTA: Brand: MEDLINE INDUSTRIES, INC.

## (undated) DEVICE — SUTURE ABSORBABLE MONOFILAMENT 4-0 PS1 18 IN UD MONOCRYL + SXMP1B115

## (undated) DEVICE — 2000CC GUARDIAN II: Brand: GUARDIAN

## (undated) DEVICE — STERILE HOOK LOCK LATEX FREE ELASTIC BANDAGE 3INX5YD: Brand: HOOK LOCK™

## (undated) DEVICE — INTENDED FOR TISSUE SEPARATION, AND OTHER PROCEDURES THAT REQUIRE A SHARP SURGICAL BLADE TO PUNCTURE OR CUT.: Brand: BARD-PARKER ® STAINLESS STEEL BLADES

## (undated) DEVICE — SLING ARM SWTH UNIV FOAM 1 SZ FITS MOST

## (undated) DEVICE — DRAPE XR C ARM 41X74IN LF --

## (undated) DEVICE — SUT ETHLN 4-0 18IN PS2 BLK --

## (undated) DEVICE — CARDINAL HEALTH FLEXIBLE LIGHT HANDLE COVER: Brand: CARDINAL HEALTH

## (undated) DEVICE — SUTURE ETHLN SZ 2-0 L18IN NONABSORBABLE BLK L26MM PS 3/8 585H

## (undated) DEVICE — SUTURE MCRYL SZ 4-0 L27IN ABSRB UD L19MM PS-2 1/2 CIR PRIM Y426H

## (undated) DEVICE — DRESSING,GAUZE,XEROFORM,CURAD,1"X8",ST: Brand: CURAD

## (undated) DEVICE — 3M™ STERI-DRAPE™ INCISE DRAPE 1050 (60CM X 45CM): Brand: STERI-DRAPE™

## (undated) DEVICE — HAND PACK: Brand: MEDLINE INDUSTRIES, INC.

## (undated) DEVICE — SOLUTION IV 1000ML 0.9% SOD CHL

## (undated) DEVICE — DRAPE,U/SHT,SPLIT,FILM,60X84,STERILE: Brand: MEDLINE

## (undated) DEVICE — [RESECTOR CUTTER, ARTHROSCOPIC SHAVER BLADE,  DO NOT RESTERILIZE,  DO NOT USE IF PACKAGE IS DAMAGED,  KEEP DRY,  KEEP AWAY FROM SUNLIGHT]: Brand: FORMULA

## (undated) DEVICE — ABDOMINAL PAD: Brand: DERMACEA

## (undated) DEVICE — SUTURE ETHBND 2 L30IN NONABSORBABLE GRN WHT LT GRN MO-7 D8793

## (undated) DEVICE — PAD,ABDOMINAL,5"X9",ST,LF,25/BX: Brand: MEDLINE INDUSTRIES, INC.

## (undated) DEVICE — DERMABOND SKIN ADH 0.7ML -- DERMABOND ADVANCED 12/BX

## (undated) DEVICE — GOWN,REINFORCED,POLY,AURORA,XXLARGE,STR: Brand: MEDLINE

## (undated) DEVICE — ADHESIVE LIQ H2O INSOLUBLE 3 CC LO RISK N STN MASTISOL

## (undated) DEVICE — TUBING, SUCTION, 1/4" X 10', STRAIGHT: Brand: MEDLINE

## (undated) DEVICE — NDL SPNE QNCKE 18GX3.5IN LF --

## (undated) DEVICE — 1.8MM DRILL BIT WITH DEPTH MARK/QC/110MM

## (undated) DEVICE — INFLOW CASSETTE TUBING, DO NOT USE IF PACKAGE IS DAMAGED: Brand: CROSSFLOW

## (undated) DEVICE — BUR SHV CUT HLLW 6 FLUT 5.5MM --

## (undated) DEVICE — SLING ARM AD ULT

## (undated) DEVICE — PADDING CAST W2INXL4YD ST COT COHESIVE HND TEARABLE SPEC

## (undated) DEVICE — STRIP,CLOSURE,WOUND,MEDI-STRIP,1/2X4: Brand: MEDLINE

## (undated) DEVICE — PADDING CAST COHESIVE 4 YDX3 IN HND TEARABLE COTTON SPEC 100

## (undated) DEVICE — BLADE SHV CUT MENIS AGG + 4MM --